# Patient Record
Sex: FEMALE | Race: WHITE | NOT HISPANIC OR LATINO | Employment: FULL TIME | URBAN - METROPOLITAN AREA
[De-identification: names, ages, dates, MRNs, and addresses within clinical notes are randomized per-mention and may not be internally consistent; named-entity substitution may affect disease eponyms.]

---

## 2017-12-06 ENCOUNTER — GENERIC CONVERSION - ENCOUNTER (OUTPATIENT)
Dept: OTHER | Facility: OTHER | Age: 52
End: 2017-12-06

## 2017-12-11 ENCOUNTER — LAB CONVERSION - ENCOUNTER (OUTPATIENT)
Dept: OTHER | Facility: OTHER | Age: 52
End: 2017-12-11

## 2017-12-11 LAB
ADDITIONAL INFORMATION (HISTORICAL): NORMAL
ADEQUACY: (HISTORICAL): NORMAL
COMMENT (HISTORICAL): NORMAL
CYTOTECHNOLOGIST: (HISTORICAL): NORMAL
INTERPRETATION (HISTORICAL): NORMAL
LMP (HISTORICAL): NORMAL
PREV. BX: (HISTORICAL): NORMAL
PREV. PAP (HISTORICAL): NORMAL
SOURCE (HISTORICAL): NORMAL

## 2017-12-21 DIAGNOSIS — Z12.31 ENCOUNTER FOR SCREENING MAMMOGRAM FOR MALIGNANT NEOPLASM OF BREAST: ICD-10-CM

## 2017-12-22 ENCOUNTER — GENERIC CONVERSION - ENCOUNTER (OUTPATIENT)
Dept: GYNECOLOGY | Facility: CLINIC | Age: 52
End: 2017-12-22

## 2018-01-24 VITALS
DIASTOLIC BLOOD PRESSURE: 78 MMHG | WEIGHT: 157.38 LBS | HEIGHT: 68 IN | BODY MASS INDEX: 23.85 KG/M2 | SYSTOLIC BLOOD PRESSURE: 112 MMHG

## 2018-09-18 ENCOUNTER — OFFICE VISIT (OUTPATIENT)
Dept: URGENT CARE | Facility: CLINIC | Age: 53
End: 2018-09-18
Payer: COMMERCIAL

## 2018-09-18 VITALS
SYSTOLIC BLOOD PRESSURE: 132 MMHG | DIASTOLIC BLOOD PRESSURE: 62 MMHG | BODY MASS INDEX: 22.13 KG/M2 | RESPIRATION RATE: 18 BRPM | HEIGHT: 68 IN | OXYGEN SATURATION: 98 % | HEART RATE: 56 BPM | TEMPERATURE: 98 F | WEIGHT: 146 LBS

## 2018-09-18 DIAGNOSIS — J01.90 ACUTE SINUSITIS, RECURRENCE NOT SPECIFIED, UNSPECIFIED LOCATION: Primary | ICD-10-CM

## 2018-09-18 PROCEDURE — G0382 LEV 3 HOSP TYPE B ED VISIT: HCPCS | Performed by: PHYSICIAN ASSISTANT

## 2018-09-18 RX ORDER — AZITHROMYCIN 250 MG/1
TABLET, FILM COATED ORAL
Qty: 6 TABLET | Refills: 0 | Status: SHIPPED | OUTPATIENT
Start: 2018-09-18 | End: 2018-09-22

## 2018-09-18 RX ORDER — ESTRADIOL 0.04 MG/D
PATCH TRANSDERMAL
COMMUNITY
Start: 2016-08-16 | End: 2018-12-20 | Stop reason: SDUPTHER

## 2018-09-18 RX ORDER — FLUTICASONE PROPIONATE 50 MCG
SPRAY, SUSPENSION (ML) NASAL
COMMUNITY
Start: 2018-08-01

## 2018-09-18 RX ORDER — MONTELUKAST SODIUM 10 MG/1
TABLET ORAL
COMMUNITY
Start: 2014-05-20

## 2018-09-18 NOTE — PATIENT INSTRUCTIONS
Take azithromycin as directed  Increase fluids and rest  Take antihistamine such as allegra or zyrtec  Continue flonase  If no improvement in sx in 2-3 days, f/u with PCP

## 2018-09-18 NOTE — PROGRESS NOTES
NAME: Richard Chamberlain is a 45 y o  female  : 1965    MRN: [id number]      Assessment and Plan   Acute sinusitis, recurrence not specified, unspecified location [J01 90]  1  Acute sinusitis, recurrence not specified, unspecified location  azithromycin (ZITHROMAX) 250 mg tablet           Patient Instructions   Patient Instructions   Take azithromycin as directed  Increase fluids and rest  Take antihistamine such as allegra or zyrtec  Continue flonase  If no improvement in sx in 2-3 days, f/u with PCP      Proceed to ER if symptoms worsen  History of Present Illness     Patient presents complaining of 1 day hx of sinus pain and pressure  She reports long hx of allergies for which she takes montelukast and flonse for  She reports sinus pain and pressure, ear fullness, coryza and rhinorrhea  She denies fevers, chills, SOB, dyspnea, CP  She has not taken anything OTC for her sx  Patient reports hx of adrenal issues for which she is seeing endocrine for and states she is unable to take a steroid  Patient reports hx of sinus infections  Review of Systems   Review of Systems   Constitutional: Negative for chills and fever  HENT: Positive for congestion, ear pain, postnasal drip, rhinorrhea, sinus pain and sinus pressure  Negative for sore throat  Respiratory: Positive for cough  Negative for chest tightness, shortness of breath, wheezing and stridor  Cardiovascular: Negative for chest pain and palpitations           Current Medications       Current Outpatient Prescriptions:     estradiol (CLIMARA) 0 0375 MG/24HR, Place on the skin, Disp: , Rfl:     fluticasone (FLONASE) 50 mcg/act nasal spray, USE 1 SPRAY IN EACH NOSTRIL EVERY DAY, Disp: , Rfl:     montelukast (SINGULAIR) 10 mg tablet, Take by mouth, Disp: , Rfl:     azithromycin (ZITHROMAX) 250 mg tablet, Take 2 tablets today then 1 tablet daily x 4 days, Disp: 6 tablet, Rfl: 0    Current Allergies     Allergies as of 2018 - Reviewed 09/18/2018   Allergen Reaction Noted    Other  04/21/2015    Prochlorperazine Other (See Comments) 12/04/2001    Sulfa antibiotics Hives 10/02/2012    Sulfamethoxazole-trimethoprim  04/21/2015    Topiramate Rash 11/06/2013              No past medical history on file  No past surgical history on file  No family history on file  Medications have been verified  The following portions of the patient's history were reviewed and updated as appropriate: allergies, current medications, past family history, past medical history, past social history, past surgical history and problem list     Objective   /62 (Patient Position: Sitting)   Pulse 56   Temp 98 °F (36 7 °C)   Resp 18   Ht 5' 8" (1 727 m)   Wt 66 2 kg (146 lb)   SpO2 98%   BMI 22 20 kg/m²      Physical Exam     Physical Exam   Constitutional: She appears well-developed and well-nourished  No distress  HENT:   TMs clear b/l without erythema or bulging  Mucopurulent fluid noted behind both TMs  Canals are patent without erythema or edema  Nasal mucosa with erythema and edema  Oropharynx without erythema, edema, tonsillar hypertrophy or exudates  +PND   Neck: Neck supple  Cardiovascular: Normal rate, regular rhythm and normal heart sounds  Pulmonary/Chest: Effort normal and breath sounds normal  No respiratory distress  She has no wheezes  She has no rales  Lymphadenopathy:     She has no cervical adenopathy

## 2018-10-17 ENCOUNTER — OFFICE VISIT (OUTPATIENT)
Dept: GYNECOLOGY | Facility: CLINIC | Age: 53
End: 2018-10-17
Payer: COMMERCIAL

## 2018-10-17 VITALS
WEIGHT: 148.2 LBS | SYSTOLIC BLOOD PRESSURE: 104 MMHG | BODY MASS INDEX: 22.46 KG/M2 | DIASTOLIC BLOOD PRESSURE: 68 MMHG | HEIGHT: 68 IN

## 2018-10-17 DIAGNOSIS — B37.9 CANDIDIASIS: Primary | ICD-10-CM

## 2018-10-17 DIAGNOSIS — N90.7 LABIAL CYST: ICD-10-CM

## 2018-10-17 PROCEDURE — 99213 OFFICE O/P EST LOW 20 MIN: CPT | Performed by: OBSTETRICS & GYNECOLOGY

## 2018-10-17 NOTE — PROGRESS NOTES
Patient presents to the office today complaining of 2 cysts in the labial area 1 on the right side and 1 on the left side  One of them was minimally tender  She she does has some vaginal itching  Denies any fever or urinary complaints  Physical examination:  Abdomen is soft nontender  On pelvic exam there are there is a small inclusion cyst in the left labia minora an on the inferior aspect of the right labia minora  There is also evidence of candidiasis infection  Cervix is normal   Uterus is absent with no adnexal masses    Impression labial cysts  Since asymptomatic will follow     Candidiasis:  Terazol 7 q h s  x1 week

## 2018-12-20 DIAGNOSIS — Z78.0 MENOPAUSE: Primary | ICD-10-CM

## 2018-12-20 RX ORDER — ESTRADIOL 0.04 MG/D
1 PATCH TRANSDERMAL WEEKLY
Qty: 8 PATCH | Refills: 0 | Status: SHIPPED | OUTPATIENT
Start: 2018-12-20 | End: 2018-12-27 | Stop reason: SDUPTHER

## 2018-12-27 ENCOUNTER — ANNUAL EXAM (OUTPATIENT)
Dept: GYNECOLOGY | Facility: CLINIC | Age: 53
End: 2018-12-27
Payer: COMMERCIAL

## 2018-12-27 VITALS
BODY MASS INDEX: 21.76 KG/M2 | DIASTOLIC BLOOD PRESSURE: 64 MMHG | HEIGHT: 68 IN | WEIGHT: 143.6 LBS | SYSTOLIC BLOOD PRESSURE: 102 MMHG

## 2018-12-27 DIAGNOSIS — Z79.890 HORMONE REPLACEMENT THERAPY (HRT): ICD-10-CM

## 2018-12-27 DIAGNOSIS — Z01.419 ENCOUNTER FOR GYNECOLOGICAL EXAMINATION WITHOUT ABNORMAL FINDING: Primary | ICD-10-CM

## 2018-12-27 DIAGNOSIS — Z78.0 MENOPAUSE: ICD-10-CM

## 2018-12-27 DIAGNOSIS — Z01.419 ENCOUNTER FOR GYNECOLOGICAL EXAMINATION WITH PAPANICOLAOU SMEAR OF CERVIX: ICD-10-CM

## 2018-12-27 DIAGNOSIS — Z13.820 SCREENING FOR OSTEOPOROSIS: ICD-10-CM

## 2018-12-27 PROCEDURE — G0143 SCR C/V CYTO,THINLAYER,RESCR: HCPCS | Performed by: OBSTETRICS & GYNECOLOGY

## 2018-12-27 PROCEDURE — 76977 US BONE DENSITY MEASURE: CPT | Performed by: OBSTETRICS & GYNECOLOGY

## 2018-12-27 PROCEDURE — S0612 ANNUAL GYNECOLOGICAL EXAMINA: HCPCS | Performed by: OBSTETRICS & GYNECOLOGY

## 2018-12-27 RX ORDER — ESTRADIOL 0.04 MG/D
1 PATCH TRANSDERMAL 2 TIMES WEEKLY
Qty: 24 PATCH | Refills: 3 | Status: SHIPPED | OUTPATIENT
Start: 2018-12-27 | End: 2019-03-19

## 2018-12-27 NOTE — PROGRESS NOTES
Assessment/Plan:         Diagnoses and all orders for this visit:    Encounter for gynecological examination without abnormal finding    Hormone replacement therapy (HRT): will taper off ERT    Screening for osteoporosis: heel scan -0 3        Subjective:      Patient ID: Oriana Bell is a 52 y o  female  HPI   Annual exam  No c/o  On ERT  Prior [facility]  Colonoscopy age 46 ( 8 yrs)     The following portions of the patient's history were reviewed and updated as appropriate: allergies, current medications, past family history, past medical history, past social history, past surgical history and problem list     Review of Systems   Constitutional: Negative  Gastrointestinal: Negative  Genitourinary: Negative  Objective:      /64   Ht 5' 8" (1 727 m)   Wt 65 1 kg (143 lb 9 6 oz)   BMI 21 83 kg/m²          Physical Exam   Constitutional: She appears well-developed and well-nourished  Neck: Normal range of motion  Neck supple  No thyromegaly present  Cardiovascular: Normal rate, regular rhythm and normal heart sounds  Pulmonary/Chest: Effort normal and breath sounds normal  No respiratory distress  Right breast exhibits no inverted nipple, no mass, no nipple discharge, no skin change and no tenderness  Left breast exhibits no inverted nipple, no mass, no nipple discharge, no skin change and no tenderness  Breasts are symmetrical    Abdominal: Soft  Bowel sounds are normal  She exhibits no distension and no mass  There is no tenderness  There is no rebound and no guarding  Hernia confirmed negative in the right inguinal area and confirmed negative in the left inguinal area  Genitourinary: There is no rash or lesion on the right labia  There is no rash or lesion on the left labia  Cervix exhibits no motion tenderness, no discharge and no friability  Right adnexum displays no mass, no tenderness and no fullness  Left adnexum displays no mass, no tenderness and no fullness   No bleeding in the vagina  No vaginal discharge found  Lymphadenopathy:        Right: No inguinal adenopathy present  Left: No inguinal adenopathy present

## 2019-01-03 LAB
LAB AP GYN PRIMARY INTERPRETATION: NORMAL
Lab: NORMAL

## 2019-01-07 ENCOUNTER — TELEPHONE (OUTPATIENT)
Dept: GYNECOLOGY | Facility: CLINIC | Age: 54
End: 2019-01-07

## 2019-01-07 NOTE — TELEPHONE ENCOUNTER
Called and left message on cell phone for pt   To continue the patch twice weekly call back if no improvement

## 2019-01-07 NOTE — TELEPHONE ENCOUNTER
Pt  Is having worsening anxiety pt  believes it is because she is only using her hormone patch once weekly

## 2019-01-16 ENCOUNTER — DOCUMENTATION (OUTPATIENT)
Dept: GYNECOLOGY | Facility: CLINIC | Age: 54
End: 2019-01-16

## 2019-01-16 DIAGNOSIS — Z78.0 MENOPAUSE: Primary | ICD-10-CM

## 2019-01-16 RX ORDER — AMITRIPTYLINE HYDROCHLORIDE 25 MG/1
25 TABLET, FILM COATED ORAL
Qty: 30 TABLET | Refills: 0 | Status: SHIPPED | OUTPATIENT
Start: 2019-01-16 | End: 2019-01-28 | Stop reason: SDUPTHER

## 2019-01-16 NOTE — PROGRESS NOTES
Pt called she went back on the patch after being off it for 3 weeks restarted it and has been on it weekly for 7 weeks has been having a lot of anxiety feels overwhelmed all the time was told to increase the patch to 2x a week which she is doing but need some help till those kick in Spoke to Dr Dari Pettit he will give her oral pill to help her RX called in

## 2019-01-28 DIAGNOSIS — Z78.0 MENOPAUSE: ICD-10-CM

## 2019-01-29 RX ORDER — AMITRIPTYLINE HYDROCHLORIDE 25 MG/1
25 TABLET, FILM COATED ORAL
Qty: 90 TABLET | Refills: 0 | Status: SHIPPED | OUTPATIENT
Start: 2019-01-29 | End: 2019-02-26

## 2019-02-26 ENCOUNTER — OFFICE VISIT (OUTPATIENT)
Dept: OBGYN CLINIC | Facility: CLINIC | Age: 54
End: 2019-02-26
Payer: COMMERCIAL

## 2019-02-26 VITALS
DIASTOLIC BLOOD PRESSURE: 66 MMHG | WEIGHT: 146 LBS | HEIGHT: 68 IN | SYSTOLIC BLOOD PRESSURE: 112 MMHG | BODY MASS INDEX: 22.13 KG/M2

## 2019-02-26 DIAGNOSIS — Z78.0 MENOPAUSE: ICD-10-CM

## 2019-02-26 PROCEDURE — 99202 OFFICE O/P NEW SF 15 MIN: CPT | Performed by: OBSTETRICS & GYNECOLOGY

## 2019-02-26 RX ORDER — CHOLECALCIFEROL (VITAMIN D3) 25 MCG
CAPSULE ORAL
COMMUNITY
Start: 2013-10-03

## 2019-02-26 NOTE — PROGRESS NOTES
ERT discussion    Pt underwent a hysterectomy and started on ERT @ 46  Pt is on generic for climara 0 0375 2 x week  presciently ran out and was finding out that she was unable to cope   She was what she described as crazy   Angry fighting all the time  Lashing out at  and people for no reason   Was unable to sleep    Pt was placed on antidepressing at this time and was way worse she stopped all that when re started medication   She feels more and more like her self on a regular basis     Pt also states when she was off she was having a increase in libido    She spoke to her adrenal specialist and they recommend her to go on bio identical hormones    Climara is one with estrogen that is plant based and no equine based    Discussed the risk of HRT and she is aware  Getting mammogram regular     After discussion she will stay on what she was on with climara and over the next several months ween her self down and off    Advised that this is a long process and could take months    Will also speak to her doctor for recommendation for supplements for low libido    Follow up yearly       Counseling and coordination of care     I have spent 153 minutes  with patient today more than 50% in counseling and coordinating care        Patient was counseled regarding  Menopause  Hot flashes  medication and side effects         Pap normal yearly since 2013  Mammogram 12/2017 [facility] reports that she went in 1/2019

## 2019-02-27 NOTE — PATIENT INSTRUCTIONS
Menopause   WHAT YOU NEED TO KNOW:   Menopause is a normal stage in a woman's life when her monthly periods stop  Menopause starts when the ovaries slowly stop making the female hormones estrogen and progesterone  A woman who has not had a period for a full year after the age of 39 is considered to be in menopause  Perimenopause is a stage before menopause that may cause signs and symptoms similar to menopause  Perimenopause can last an average of 4 to 5 years  DISCHARGE INSTRUCTIONS:   Follow up with your healthcare provider as directed:  Write down your questions so you remember to ask them during your visits  Self-care:   · Manage hot flashes  Hot flashes are brief periods of feeling very warm, flushed, and sweaty  Hot flashes can last from a few seconds to several minutes  They may happen many times during the day, and are common at night  Layer your clothing so that you can easily remove some clothing and cool yourself during a hot flash  Cold drinks may also be helpful  · Reduce vaginal dryness  by using over-the-counter vaginal creams  Vaginal dryness may cause you to have pain or discomfort during sex  Only use creams that are made for vaginal use  Do  not  use petroleum jelly  You may need an estrogen cream to put in and around your vagina  Estrogen cream may help decrease vaginal dryness and lower your risk of vaginal infections  · Continue to use birth control  during perimenopause if you do not want to get pregnant  You may need to use birth control until it has been 1 year since your periods stopped  Ask your healthcare provider when you can stop using birth control to prevent pregnancy  Lead a healthy lifestyle:  After menopause, your risk for heart disease and bone loss increases  Ask about these and other ways to stay healthy:  · Exercise regularly  Exercise helps you maintain a healthy weight  Exercise can also help to control your blood pressure and cholesterol levels   Include weight-bearing exercise for strong bones  Ask your healthcare provider about the best exercise plan for you  · Eat a variety of healthy foods  Include fruits, vegetables, whole grains (whole-wheat bread, pasta, and cereals), low-fat dairy, and lean protein foods (beans, poultry, and fish)  Limit foods high in sodium (salt)  Ask your healthcare provider for more information about a meal plan that is right for you  · Maintain a healthy weight  Check with your healthcare provider before you start any weight loss program      · Take supplements as directed  You may need extra calcium and vitamin D to help prevent osteoporosis  · Limit alcohol and caffeine  Alcohol and caffeine may worsen your symptoms  · Do not smoke  If you smoke, it is never too late to quit  You are more likely to have a heart attack, lung disease, blood clots, and cancer if you smoke  Ask your healthcare provider for information if you need help quitting  Contact your healthcare provider if:   · You have vaginal bleeding after menopause  · You have questions or concerns about your condition or care  © 2017 [facility] Information is for End User's use only and may not be sold, redistributed or otherwise used for commercial purposes  All illustrations and images included in CareNotes® are the copyrighted property of A D A M , Inc  or Humberto Thomas  The above information is an  only  It is not intended as medical advice for individual conditions or treatments  Talk to your doctor, nurse or pharmacist before following any medical regimen to see if it is safe and effective for you

## 2024-04-11 ENCOUNTER — OFFICE VISIT (OUTPATIENT)
Dept: FAMILY MEDICINE CLINIC | Facility: CLINIC | Age: 59
End: 2024-04-11
Payer: COMMERCIAL

## 2024-04-11 VITALS
HEIGHT: 68 IN | DIASTOLIC BLOOD PRESSURE: 54 MMHG | BODY MASS INDEX: 23.13 KG/M2 | HEART RATE: 59 BPM | WEIGHT: 152.6 LBS | SYSTOLIC BLOOD PRESSURE: 95 MMHG | OXYGEN SATURATION: 98 %

## 2024-04-11 DIAGNOSIS — B96.89 ACUTE BACTERIAL SINUSITIS: ICD-10-CM

## 2024-04-11 DIAGNOSIS — R05.9 COUGH, UNSPECIFIED TYPE: Primary | ICD-10-CM

## 2024-04-11 DIAGNOSIS — J01.90 ACUTE BACTERIAL SINUSITIS: ICD-10-CM

## 2024-04-11 PROBLEM — E27.9 ADRENAL GLAND DYSFUNCTION (HCC): Status: ACTIVE | Noted: 2024-04-11

## 2024-04-11 PROCEDURE — 99213 OFFICE O/P EST LOW 20 MIN: CPT | Performed by: FAMILY MEDICINE

## 2024-04-11 RX ORDER — DOXYCYCLINE HYCLATE 100 MG
100 TABLET ORAL 2 TIMES DAILY
Qty: 14 TABLET | Refills: 0 | Status: SHIPPED | OUTPATIENT
Start: 2024-04-11 | End: 2024-04-18

## 2024-04-11 RX ORDER — ESCITALOPRAM OXALATE 10 MG/1
1 TABLET ORAL DAILY
COMMUNITY
Start: 2024-04-08

## 2024-04-11 NOTE — PROGRESS NOTES
"Assessment/Plan:       1. Cough, unspecified type  Comments:  start doxycycline    2. Acute bacterial sinusitis  Comments:  start doxycycline  Orders:  -     doxycycline hyclate (VIBRA-TABS) 100 mg tablet; Take 1 tablet (100 mg total) by mouth 2 (two) times a day for 7 days        Consider advair if needed  Subjective:      Patient ID: Casie Rosenberg is a 58 y.o. female.    Casie has had a cough for many months.  She was initially dx with pneumonia and given a penicillin antibiotic.  Her symptoms returned she was told she had a viral infection and had a chest x-ray which was clear.  She still has a persistent cough that is nagging.  She does not have a fever she did have a sore throat and some fatigue at some point.  She is not having any weight loss or chills or night sweats.  The cough is worse in the morning.  She does have some sinus pain and pressure with postnasal drip and allergies.    Cough        The following portions of the patient's history were reviewed and updated as appropriate: allergies, current medications, past family history, past medical history, past social history, past surgical history, and problem list.    Review of Systems   Respiratory:  Positive for cough.          Objective:      BP 95/54 (BP Location: Left arm, Patient Position: Sitting, Cuff Size: Large)   Pulse 59   Ht 5' 8\" (1.727 m)   Wt 69.2 kg (152 lb 9.6 oz)   SpO2 98%   BMI 23.20 kg/m²          Physical Exam  Vitals and nursing note reviewed.   Constitutional:       Appearance: Normal appearance.   HENT:      Head: Normocephalic and atraumatic.      Nose: Nose normal.      Mouth/Throat:      Mouth: Mucous membranes are moist.   Eyes:      Extraocular Movements: Extraocular movements intact.      Pupils: Pupils are equal, round, and reactive to light.   Cardiovascular:      Rate and Rhythm: Normal rate and regular rhythm.      Pulses: Normal pulses.   Pulmonary:      Effort: Pulmonary effort is normal.      Breath sounds: " Normal breath sounds.   Abdominal:      General: Bowel sounds are normal.      Palpations: Abdomen is soft.   Musculoskeletal:      Cervical back: Normal range of motion.   Skin:     General: Skin is warm and dry.      Capillary Refill: Capillary refill takes less than 2 seconds.   Neurological:      General: No focal deficit present.      Mental Status: She is alert.   Psychiatric:         Mood and Affect: Mood normal.

## 2024-06-22 ENCOUNTER — PATIENT MESSAGE (OUTPATIENT)
Dept: FAMILY MEDICINE CLINIC | Facility: CLINIC | Age: 59
End: 2024-06-22

## 2024-06-24 NOTE — PATIENT COMMUNICATION
Pt returned call to schedule the office visit helped to get her for this Thursday as per her request. Thanks

## 2024-06-27 ENCOUNTER — OFFICE VISIT (OUTPATIENT)
Dept: FAMILY MEDICINE CLINIC | Facility: CLINIC | Age: 59
End: 2024-06-27
Payer: COMMERCIAL

## 2024-06-27 VITALS
SYSTOLIC BLOOD PRESSURE: 103 MMHG | HEIGHT: 68 IN | WEIGHT: 152.6 LBS | OXYGEN SATURATION: 98 % | HEART RATE: 51 BPM | DIASTOLIC BLOOD PRESSURE: 55 MMHG | BODY MASS INDEX: 23.13 KG/M2

## 2024-06-27 DIAGNOSIS — Z12.11 COLON CANCER SCREENING: ICD-10-CM

## 2024-06-27 DIAGNOSIS — R31.0 GROSS HEMATURIA: Primary | ICD-10-CM

## 2024-06-27 DIAGNOSIS — Z12.11 SCREENING FOR COLON CANCER: ICD-10-CM

## 2024-06-27 DIAGNOSIS — F41.9 ANXIETY: ICD-10-CM

## 2024-06-27 PROCEDURE — 99214 OFFICE O/P EST MOD 30 MIN: CPT | Performed by: FAMILY MEDICINE
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
## 2024-06-28 ENCOUNTER — TELEPHONE (OUTPATIENT)
Age: 59
End: 2024-06-28

## 2024-06-28 ENCOUNTER — TELEPHONE (OUTPATIENT)
Dept: FAMILY MEDICINE CLINIC | Facility: CLINIC | Age: 59
End: 2024-06-28

## 2024-06-28 DIAGNOSIS — R31.0 GROSS HEMATURIA: Primary | ICD-10-CM

## 2024-06-28 NOTE — TELEPHONE ENCOUNTER
Radiology department called needs these med's called into pharmacy for CT next week    Medrol 23 mg po 12 hours before her exam   Medrol 32 po 2 hours before   Benadryl 50 mg po 1 hr before exam

## 2024-06-28 NOTE — TELEPHONE ENCOUNTER
Sandy from [facility] radiology called in stating that the patient is scheduled for CT scan. And needs some Allergy prep to be ordered by Dr. Budinetz. She has the med list and she wish to provide it to nurse. Triston transferred the call to practice clinical was answered by Jennie. Thanks

## 2024-06-28 NOTE — NURSING NOTE
Called patient to verify contrast allergy for upcoming CT renal protocol scan on 7/3/24. Patient states she had some snee[facility] for [facility] She denied receiving treatment or any other interventions. Verified with radiologist, Dr. Pritchett, that standard allergy prep will need to be ordered. Contacted ordering provider (Dr. Budinetz) office and spoke with Jennie. Provided medrol and benadryl dosing, routes and time of administration with a need for provider to call a script to patient's pharmacy. Returned call to patient detailing allergy prep medications and exact times of administration. Using teach-back method, patient verbalized understanding.

## 2024-07-01 NOTE — TELEPHONE ENCOUNTER
Patient called to find out the status of medication for Medrol 23 mg po 12 hours before her exam Medrol 32 po 2 hours before Benadryl 50 mg po 1 hr before exam for allergy to CT contrast. Informed the patient that The CT scan was ordered With out contrast. Patient verbalized understanding

## 2024-07-02 ENCOUNTER — TELEPHONE (OUTPATIENT)
Dept: INTERVENTIONAL RADIOLOGY/VASCULAR | Facility: HOSPITAL | Age: 59
End: 2024-07-02

## 2024-07-03 ENCOUNTER — HOSPITAL ENCOUNTER (OUTPATIENT)
Dept: CT IMAGING | Facility: HOSPITAL | Age: 59
Discharge: HOME | End: 2024-07-03
Payer: COMMERCIAL

## 2024-07-03 DIAGNOSIS — R31.0 GROSS HEMATURIA: ICD-10-CM

## 2024-07-03 PROCEDURE — 74176 CT ABD & PELVIS W/O CONTRAST: CPT

## 2024-07-05 ENCOUNTER — TELEPHONE (OUTPATIENT)
Dept: FAMILY MEDICINE CLINIC | Facility: CLINIC | Age: 59
End: 2024-07-05

## 2024-07-05 DIAGNOSIS — N20.0 NEPHROLITHIASIS: Primary | ICD-10-CM

## 2024-07-05 NOTE — TELEPHONE ENCOUNTER
----- Message from Robert Budinetz, MD sent at 7/4/2024  7:02 AM EDT -----  She does have bilateral kidney stones probably accounting for her symptoms.  None are acutely causing the problem.  I would recommend that she follow-up with a kidney stone specialist.  Dr. Parekh at [facility] would be a good option

## 2024-07-25 LAB — COLOGUARD RESULT REPORTABLE: NEGATIVE

## 2024-08-29 ENCOUNTER — TELEMEDICINE (OUTPATIENT)
Dept: FAMILY MEDICINE CLINIC | Facility: CLINIC | Age: 59
End: 2024-08-29
Payer: COMMERCIAL

## 2024-08-29 VITALS — WEIGHT: 152.6 LBS | HEIGHT: 68 IN | BODY MASS INDEX: 23.13 KG/M2

## 2024-08-29 DIAGNOSIS — J06.9 UPPER RESPIRATORY TRACT INFECTION, UNSPECIFIED TYPE: Primary | ICD-10-CM

## 2024-08-29 PROCEDURE — 99213 OFFICE O/P EST LOW 20 MIN: CPT | Performed by: FAMILY MEDICINE

## 2024-08-29 RX ORDER — ESTRADIOL 0.1 MG/G
CREAM VAGINAL
COMMUNITY
Start: 2024-07-01

## 2024-08-29 RX ORDER — AZITHROMYCIN 250 MG/1
TABLET, FILM COATED ORAL
Qty: 6 TABLET | Refills: 0 | Status: SHIPPED | OUTPATIENT
Start: 2024-08-29 | End: 2024-09-02

## 2024-08-29 NOTE — PROGRESS NOTES
"Virtual Regular Visit  Name: Casie Rosenberg      : 1965      MRN: [id number]  Encounter Provider: Robert Budinetz, MD  Encounter Date: 2024   Encounter department: [facility]    Verification of patient location:    Patient is located at Home in the following state in which I hold an active license [place]    Assessment & Plan   1. Upper respiratory tract infection, unspecified type  -     azithromycin (ZITHROMAX) 250 mg tablet; Take 2 tablets today then 1 tablet daily x 4 days  Symptomatic care  Zpak if no better over the weekend       Encounter provider Robert Budinetz, MD    The patient was identified by name and date of birth. Casie Rosenberg was informed that this is a telemedicine visit and that the visit is being conducted through the [employer] platform. She agrees to proceed..  My office door was closed. No one else was in the room.  She acknowledged consent and understanding of privacy and security of the video platform. The patient has agreed to participate and understands they can discontinue the visit at any time.    Patient is aware this is a billable service.     History of Present Illness     The patient is not feeling well for a few days.  She does not have a fever but does have aches/chills/sweats/flushing/nausea/sore throat/fatigue.  She did a covid test which was negative.  She has a cough.  She has mild nasal congestion.  This started 4 days ago.  She started with nausea.  Her energy is really drained.        Review of Systems   Respiratory: Negative.     Cardiovascular: Negative.    Gastrointestinal: Negative.      Pertinent Medical History         Objective     Ht 5' 8\" (1.727 m)   Wt 69.2 kg (152 lb 9.6 oz)   BMI 23.20 kg/m²   Physical Exam  Constitutional:       Appearance: She is well-developed.         Visit Time  Total Visit Duration: 12 min        "

## 2024-12-09 ENCOUNTER — RA CDI HCC (OUTPATIENT)
Dept: OTHER | Facility: HOSPITAL | Age: 59
End: 2024-12-09

## 2024-12-11 ENCOUNTER — TELEPHONE (OUTPATIENT)
Dept: ADMINISTRATIVE | Facility: OTHER | Age: 59
End: 2024-12-11

## 2024-12-11 NOTE — TELEPHONE ENCOUNTER
Upon review of the In Basket request we were able to locate, review, and update the patient chart as requested for Pap Smear (HPV) aka Cervical Cancer Screening.    Any additional questions or concerns should be emailed to the Practice Liaisons via the appropriate education email address, please do not reply via In Basket.    Thank you  BETTY GUERRERO MA   [facility]

## 2024-12-11 NOTE — TELEPHONE ENCOUNTER
----- Message from Hodan DEL VALLE sent at 12/11/2024  8:49 AM EST -----  Regarding: Care Gap request  12/11/24 8:49 AM    Hello, our patient attached above has had Pap Smear (HPV) aka Cervical Cancer Screening completed/performed. Please assist in updating the patient chart by pulling the Care Everywhere (CE) document. The date of service is 6/2024.     Thank you,  Hodan Gifford  [facility]

## 2024-12-13 ENCOUNTER — OFFICE VISIT (OUTPATIENT)
Dept: FAMILY MEDICINE CLINIC | Facility: CLINIC | Age: 59
End: 2024-12-13
Payer: COMMERCIAL

## 2024-12-13 VITALS
DIASTOLIC BLOOD PRESSURE: 64 MMHG | SYSTOLIC BLOOD PRESSURE: 104 MMHG | OXYGEN SATURATION: 99 % | BODY MASS INDEX: 23.73 KG/M2 | HEIGHT: 68 IN | WEIGHT: 156.6 LBS | HEART RATE: 61 BPM

## 2024-12-13 DIAGNOSIS — H69.93 EUSTACHIAN TUBE DYSFUNCTION, BILATERAL: Primary | ICD-10-CM

## 2024-12-13 DIAGNOSIS — E27.9 ADRENAL GLAND DYSFUNCTION (HCC): ICD-10-CM

## 2024-12-13 PROCEDURE — 99213 OFFICE O/P EST LOW 20 MIN: CPT | Performed by: FAMILY MEDICINE

## 2024-12-13 RX ORDER — FLUCONAZOLE 150 MG/1
TABLET ORAL
COMMUNITY
Start: 2024-09-05 | End: 2024-12-13

## 2024-12-13 RX ORDER — PREDNISONE 10 MG/1
TABLET ORAL
Qty: 24 TABLET | Refills: 0 | Status: SHIPPED | OUTPATIENT
Start: 2024-12-13 | End: 2024-12-25

## 2024-12-13 NOTE — PROGRESS NOTES
"Name: Casie Rosenberg      : 1965      MRN: [id number]  Encounter Provider: Robert Budinetz, MD  Encounter Date: 2024   Encounter department: [facility]  :  Assessment & Plan  Eustachian tube dysfunction, bilateral    Orders:  •  predniSONE 10 mg tablet; Take 3 tablets (30 mg total) by mouth daily for 4 days, THEN 2 tablets (20 mg total) daily for 4 days, THEN 1 tablet (10 mg total) daily for 4 days.  start prednisone  Adrenal gland dysfunction (HCC)                History of Present Illness     The patient was recently seen in urgent care.  She had some dizziness and vertigo and bilateral ear fullness.  She was given a decongestant and nasal steroid.  Her symptoms persist.  She did have some nausea.  On exam she does have posterior tympanic membrane fluid.  Her symptoms are consistent with eustachian tube dysfunction.  We will start a taper of a low-dose steroid.  She can continue the nasal steroid.    Dizziness    Review of Systems   Respiratory: Negative.     Cardiovascular: Negative.    Gastrointestinal: Negative.    Neurological:  Positive for dizziness.       Objective   /64 (BP Location: Left arm, Patient Position: Sitting, Cuff Size: Standard)   Pulse 61   Ht 5' 8\" (1.727 m)   Wt 71 kg (156 lb 9.6 oz)   SpO2 99%   BMI 23.81 kg/m²      Physical Exam  Vitals and nursing note reviewed.   Constitutional:       General: She is not in acute distress.     Appearance: She is well-developed.   HENT:      Head: Normocephalic and atraumatic.      Ears:      Comments: TM's full b/l with posterior fluid  Eyes:      Conjunctiva/sclera: Conjunctivae normal.   Cardiovascular:      Rate and Rhythm: Normal rate and regular rhythm.      Heart sounds: No murmur heard.  Pulmonary:      Effort: Pulmonary effort is normal. No respiratory distress.      Breath sounds: Normal breath sounds.   Abdominal:      Palpations: Abdomen is soft.      Tenderness: There is no abdominal tenderness. "   Musculoskeletal:         General: No swelling.      Cervical back: Neck supple.   Skin:     General: Skin is warm and dry.      Capillary Refill: Capillary refill takes less than 2 seconds.   Neurological:      Mental Status: She is alert.   Psychiatric:         Mood and Affect: Mood normal.

## 2024-12-23 DIAGNOSIS — H66.90 OTITIS, UNSPECIFIED LATERALITY: Primary | ICD-10-CM

## 2024-12-23 RX ORDER — AZITHROMYCIN 250 MG/1
TABLET, FILM COATED ORAL
Qty: 6 TABLET | Refills: 0 | Status: SHIPPED | OUTPATIENT
Start: 2024-12-23 | End: 2024-12-27

## 2025-03-10 ENCOUNTER — LAB (OUTPATIENT)
Dept: LAB | Facility: CLINIC | Age: 60
End: 2025-03-10
Payer: COMMERCIAL

## 2025-03-10 ENCOUNTER — OFFICE VISIT (OUTPATIENT)
Dept: FAMILY MEDICINE CLINIC | Facility: CLINIC | Age: 60
End: 2025-03-10
Payer: COMMERCIAL

## 2025-03-10 VITALS
BODY MASS INDEX: 24.46 KG/M2 | SYSTOLIC BLOOD PRESSURE: 124 MMHG | HEIGHT: 68 IN | DIASTOLIC BLOOD PRESSURE: 80 MMHG | RESPIRATION RATE: 16 BRPM | WEIGHT: 161.4 LBS | HEART RATE: 64 BPM | OXYGEN SATURATION: 98 %

## 2025-03-10 DIAGNOSIS — E06.3 HYPOTHYROIDISM DUE TO HASHIMOTO THYROIDITIS: Primary | ICD-10-CM

## 2025-03-10 DIAGNOSIS — E27.9 ADRENAL GLAND DYSFUNCTION (HCC): ICD-10-CM

## 2025-03-10 DIAGNOSIS — Z13.220 LIPID SCREENING: ICD-10-CM

## 2025-03-10 DIAGNOSIS — R53.83 OTHER FATIGUE: ICD-10-CM

## 2025-03-10 DIAGNOSIS — R63.5 WEIGHT GAIN: ICD-10-CM

## 2025-03-10 DIAGNOSIS — R10.11 RUQ PAIN: Primary | ICD-10-CM

## 2025-03-10 LAB
ALBUMIN SERPL BCG-MCNC: 4.5 G/DL (ref 3.5–5)
ALP SERPL-CCNC: 86 U/L (ref 34–104)
ALT SERPL W P-5'-P-CCNC: 11 U/L (ref 7–52)
ANION GAP SERPL CALCULATED.3IONS-SCNC: 6 MMOL/L (ref 4–13)
AST SERPL W P-5'-P-CCNC: 11 U/L (ref 13–39)
BASOPHILS # BLD AUTO: 0.04 THOUSANDS/ÂΜL (ref 0–0.1)
BASOPHILS NFR BLD AUTO: 1 % (ref 0–1)
BILIRUB SERPL-MCNC: 0.59 MG/DL (ref 0.2–1)
BUN SERPL-MCNC: 31 MG/DL (ref 5–25)
CALCIUM SERPL-MCNC: 9.7 MG/DL (ref 8.4–10.2)
CHLORIDE SERPL-SCNC: 102 MMOL/L (ref 96–108)
CO2 SERPL-SCNC: 31 MMOL/L (ref 21–32)
CREAT SERPL-MCNC: 0.86 MG/DL (ref 0.6–1.3)
EOSINOPHIL # BLD AUTO: 0.12 THOUSAND/ÂΜL (ref 0–0.61)
EOSINOPHIL NFR BLD AUTO: 2 % (ref 0–6)
ERYTHROCYTE [DISTWIDTH] IN BLOOD BY AUTOMATED COUNT: 11.9 % (ref 11.6–15.1)
GFR SERPL CREATININE-BSD FRML MDRD: 73 ML/MIN/1.73SQ M
GLUCOSE SERPL-MCNC: 103 MG/DL (ref 65–140)
HCT VFR BLD AUTO: 42.5 % (ref 34.8–46.1)
HGB BLD-MCNC: 14.7 G/DL (ref 11.5–15.4)
IMM GRANULOCYTES # BLD AUTO: 0.01 THOUSAND/UL (ref 0–0.2)
IMM GRANULOCYTES NFR BLD AUTO: 0 % (ref 0–2)
LIPASE SERPL-CCNC: 38 U/L (ref 11–82)
LYMPHOCYTES # BLD AUTO: 1.33 THOUSANDS/ÂΜL (ref 0.6–4.47)
LYMPHOCYTES NFR BLD AUTO: 25 % (ref 14–44)
MCH RBC QN AUTO: 33.4 PG (ref 26.8–34.3)
MCHC RBC AUTO-ENTMCNC: 34.6 G/DL (ref 31.4–37.4)
MCV RBC AUTO: 97 FL (ref 82–98)
MONOCYTES # BLD AUTO: 0.48 THOUSAND/ÂΜL (ref 0.17–1.22)
MONOCYTES NFR BLD AUTO: 9 % (ref 4–12)
NEUTROPHILS # BLD AUTO: 3.37 THOUSANDS/ÂΜL (ref 1.85–7.62)
NEUTS SEG NFR BLD AUTO: 63 % (ref 43–75)
NRBC BLD AUTO-RTO: 0 /100 WBCS
PLATELET # BLD AUTO: 249 THOUSANDS/UL (ref 149–390)
PMV BLD AUTO: 10.9 FL (ref 8.9–12.7)
POTASSIUM SERPL-SCNC: 4.2 MMOL/L (ref 3.5–5.3)
PROT SERPL-MCNC: 7.3 G/DL (ref 6.4–8.4)
RBC # BLD AUTO: 4.4 MILLION/UL (ref 3.81–5.12)
SODIUM SERPL-SCNC: 139 MMOL/L (ref 135–147)
T4 FREE SERPL-MCNC: 0.8 NG/DL (ref 0.61–1.12)
TSH SERPL DL<=0.05 MIU/L-ACNC: 5.12 UIU/ML (ref 0.45–4.5)
WBC # BLD AUTO: 5.35 THOUSAND/UL (ref 4.31–10.16)

## 2025-03-10 PROCEDURE — 36415 COLL VENOUS BLD VENIPUNCTURE: CPT

## 2025-03-10 PROCEDURE — 83690 ASSAY OF LIPASE: CPT

## 2025-03-10 PROCEDURE — 84439 ASSAY OF FREE THYROXINE: CPT

## 2025-03-10 PROCEDURE — 99214 OFFICE O/P EST MOD 30 MIN: CPT | Performed by: FAMILY MEDICINE

## 2025-03-10 PROCEDURE — 85025 COMPLETE CBC W/AUTO DIFF WBC: CPT

## 2025-03-10 PROCEDURE — 80053 COMPREHEN METABOLIC PANEL: CPT

## 2025-03-10 PROCEDURE — 84443 ASSAY THYROID STIM HORMONE: CPT

## 2025-03-10 RX ORDER — ESTRADIOL 10 UG/1
1 TABLET VAGINAL 2 TIMES WEEKLY
COMMUNITY
Start: 2024-12-01 | End: 2025-03-24

## 2025-03-10 RX ORDER — ESTRADIOL 0.05 MG/D
1 PATCH TRANSDERMAL WEEKLY
COMMUNITY
Start: 2025-01-10

## 2025-03-10 NOTE — PROGRESS NOTES
Name: Casie Rosenberg      : 1965      MRN: [id number]  Encounter Provider: Robert Budinetz, MD  Encounter Date: 3/10/2025   Encounter department: [facility] PRIMARY CARE  :  Assessment & Plan  RUQ pain  Check labs and ultrasound  Orders:    US abdomen complete; Future    Lipase; Future    Other fatigue  Check labs  Orders:    CBC and differential; Future    Comprehensive metabolic panel; Future    TSH, 3rd generation with Free T4 reflex; Future    Weight gain  Check labs  Orders:    CBC and differential; Future    Comprehensive metabolic panel; Future    TSH, 3rd generation with Free T4 reflex; Future    Lipid screening  Check labs       Adrenal gland dysfunction (HCC)                History of Present Illness   The patient has had intermittent right upper quadrant pain that radiates to the right groin region for the past week.  She has had this off and on for about 10 years.  She had a negative test HIDA scan in the past.  She had a CT of her abdomen pelvis in July that showed kidney stone she is seeing a specialist had a workup that seems to be fine.  There were no findings in her liver or gallbladder at that time.  She does get migraine headaches and unusual smells from time to time which may be aura.  Perhaps she has abdominal migraine syndrome.  She was on Elavil in the past.  She has not had any fever.    Abdominal Pain  This is a new problem. The current episode started in the past 7 days. The onset quality is sudden. The problem occurs constantly. The most recent episode lasted 4 days. The problem has been unchanged. The pain is located in the periumbilical region and right flank. The pain is at a severity of 4/10. The quality of the pain is dull and a sensation of fullness. The abdominal pain radiates to the RLQ. Associated symptoms include anorexia, arthralgias, headaches and nausea. Pertinent negatives include no belching, constipation, diarrhea, dysuria, fever, flatus, frequency,  "hematochezia, hematuria, melena, myalgias, vomiting or weight loss. Nothing aggravates the pain. The pain is relieved by Nothing. Prior diagnostic workup includes barium enema, CT scan, lower endoscopy, ultrasound and upper endoscopy.     Review of Systems   Constitutional:  Negative for fever and weight loss.   Respiratory: Negative.     Cardiovascular: Negative.    Gastrointestinal:  Positive for abdominal pain, anorexia and nausea. Negative for constipation, diarrhea, flatus, hematochezia, melena and vomiting.   Genitourinary:  Negative for dysuria, frequency and hematuria.   Musculoskeletal:  Positive for arthralgias. Negative for myalgias.   Neurological:  Positive for headaches.       Objective   /80 (BP Location: Left arm, Patient Position: Sitting, Cuff Size: Standard)   Pulse 64   Resp 16   Ht 5' 8\" (1.727 m)   Wt 73.2 kg (161 lb 6.4 oz)   SpO2 98%   BMI 24.54 kg/m²      Physical Exam  Vitals and nursing note reviewed.   Constitutional:       General: She is not in acute distress.     Appearance: She is well-developed.   HENT:      Head: Normocephalic and atraumatic.   Eyes:      Conjunctiva/sclera: Conjunctivae normal.   Cardiovascular:      Rate and Rhythm: Normal rate and regular rhythm.      Heart sounds: No murmur heard.  Pulmonary:      Effort: Pulmonary effort is normal. No respiratory distress.      Breath sounds: Normal breath sounds.   Abdominal:      Palpations: Abdomen is soft.      Tenderness: There is no abdominal tenderness.   Musculoskeletal:         General: No swelling.      Cervical back: Neck supple.   Skin:     General: Skin is warm and dry.      Capillary Refill: Capillary refill takes less than 2 seconds.   Neurological:      Mental Status: She is alert.   Psychiatric:         Mood and Affect: Mood normal.         "

## 2025-03-11 ENCOUNTER — RESULTS FOLLOW-UP (OUTPATIENT)
Dept: FAMILY MEDICINE CLINIC | Facility: CLINIC | Age: 60
End: 2025-03-11

## 2025-03-11 RX ORDER — LEVOTHYROXINE SODIUM 25 UG/1
25 TABLET ORAL DAILY
Qty: 30 TABLET | Refills: 3 | Status: SHIPPED | OUTPATIENT
Start: 2025-03-11

## 2025-03-12 ENCOUNTER — NURSE TRIAGE (OUTPATIENT)
Age: 60
End: 2025-03-12

## 2025-03-12 NOTE — TELEPHONE ENCOUNTER
Lvm for pt in regards to response from provider from message sent earlier today. Asked pt to call our office when able

## 2025-03-12 NOTE — TELEPHONE ENCOUNTER
"FOLLOW UP: Callback with PCP recommendations     REASON FOR CONVERSATION: Medication Problem    SYMPTOMS: Leg pain, hip pain, headache 3 hours after first dose last night     OTHER: N/A    DISPOSITION: Callback by PCP Today      Reason for Disposition   Caller wants to use a complementary or alternative medicine    Answer Assessment - Initial Assessment Questions  1. NAME of MEDICINE: \"What medicine(s) are you calling about?\"      Levothyroxine   2. QUESTION: \"What is your question?\" (e.g., double dose of medicine, side effect)      Side effect   3. PRESCRIBER: \"Who prescribed the medicine?\" Reason: if prescribed by specialist, call should be referred to that group.      PCP  4. SYMPTOMS: \"Do you have any symptoms?\" If Yes, ask: \"What symptoms are you having?\"  \"How bad are the symptoms (e.g., mild, moderate, severe)      Leg pain, hip pain, headache 3 hours after taking medication, severe at the time  5. PREGNANCY:  \"Is there any chance that you are pregnant?\" \"When was your last menstrual period?\"      N/A    Protocols used: Medication Question Call-Adult-[place]    "

## 2025-03-12 NOTE — TELEPHONE ENCOUNTER
Patient called, relayed PCP message re: levothyroxine (Synthroid) 25 mcg tablet       I really do not have an alternative medicine       Pt is looking for additional advice re: medication:  Instead of taking at night time, can she take in the morning?  Maybe this will help lessen her symptoms of insomnia, hip/leg pain, headaches?  PCP's thoughts?    Since this is a new medication, once patient takes for a period of time, does PCP think symptoms could lessen over time?    Pt is kindly requesting a call back  [phone]

## 2025-03-13 ENCOUNTER — HOSPITAL ENCOUNTER (OUTPATIENT)
Dept: ULTRASOUND IMAGING | Facility: HOSPITAL | Age: 60
End: 2025-03-13
Payer: COMMERCIAL

## 2025-03-13 DIAGNOSIS — R10.11 RUQ PAIN: ICD-10-CM

## 2025-03-13 PROCEDURE — 76700 US EXAM ABDOM COMPLETE: CPT

## 2025-03-17 ENCOUNTER — TELEPHONE (OUTPATIENT)
Age: 60
End: 2025-03-17

## 2025-03-17 NOTE — TELEPHONE ENCOUNTER
Patient has had a US ABDOMEN COMPLETE on 3/13 and is wondering why she hasn't received any results back from the study.    Please advise and follow up with patient on her results @[phone].

## 2025-03-19 ENCOUNTER — RESULTS FOLLOW-UP (OUTPATIENT)
Dept: FAMILY MEDICINE CLINIC | Facility: CLINIC | Age: 60
End: 2025-03-19

## 2025-03-21 ENCOUNTER — TELEPHONE (OUTPATIENT)
Age: 60
End: 2025-03-21

## 2025-03-21 DIAGNOSIS — R10.11 RUQ PAIN: Primary | ICD-10-CM

## 2025-03-21 NOTE — TELEPHONE ENCOUNTER
Patient calls to report that she still has RUQ abdominal pain. No fever, no bleeding, no N/V, no diarrhea, poor appetite. Pain is uncomfortable but not severe. Patient feels like there is a mass or a ball in the area. Sitting makes her more uncomfortable. Patient can be reached at [phone]. Patient did follow up with urology. This pain is not from a urological condition.

## 2025-03-24 ENCOUNTER — TELEPHONE (OUTPATIENT)
Age: 60
End: 2025-03-24

## 2025-03-24 ENCOUNTER — TELEMEDICINE (OUTPATIENT)
Dept: FAMILY MEDICINE CLINIC | Facility: CLINIC | Age: 60
End: 2025-03-24
Payer: COMMERCIAL

## 2025-03-24 VITALS — WEIGHT: 161 LBS | HEIGHT: 68 IN | BODY MASS INDEX: 24.4 KG/M2

## 2025-03-24 DIAGNOSIS — H81.13 BENIGN PAROXYSMAL POSITIONAL VERTIGO DUE TO BILATERAL VESTIBULAR DISORDER: Primary | ICD-10-CM

## 2025-03-24 PROCEDURE — 98006 SYNCH AUDIO-VIDEO EST MOD 30: CPT | Performed by: FAMILY MEDICINE

## 2025-03-24 RX ORDER — MECLIZINE HYDROCHLORIDE 25 MG/1
25 TABLET ORAL 3 TIMES DAILY PRN
Qty: 30 TABLET | Refills: 1 | Status: SHIPPED | OUTPATIENT
Start: 2025-03-24

## 2025-03-24 RX ORDER — ALPRAZOLAM 0.25 MG
TABLET ORAL
Qty: 10 TABLET | Refills: 0 | Status: SHIPPED | OUTPATIENT
Start: 2025-03-24

## 2025-03-24 NOTE — TELEPHONE ENCOUNTER
Patient contacted the office this morning in regards to an episode of vertigo she experienced over the weekend starting Saturday night. Room was spinning. Hx in the past, was prescribed Meclizine. Unsure if this is related to the abdominal pain (OV 03/10/25), does have a severe headache and feels awful. Symptoms not as bad as Saturday but still there. If something can be prescribed please send to [facility] in [place]. Please contact patient back with an update, thank you.   Willing to schedule virtual visit with pcp if needed to discuss further.

## 2025-03-24 NOTE — PROGRESS NOTES
"Virtual Regular VisitName: Casie Rosenberg      : 1965      MRN: [id number]  Encounter Provider: Robert Budinetz, MD  Encounter Date: 3/24/2025   Encounter department: [facility] PRIMARY CARE  :  Assessment & Plan  Benign paroxysmal positional vertigo due to bilateral vestibular disorder  Start meclizine           History of Present Illness     The patient has a hx of vertigo.  She is in a current attack.  VT has not helped in the past.  She had a very bad attack recently.  Difficulty walking.  Did not get out of bed until noon the next day.  Her feet are very cold.  This is increasing in frequency.  She has an abd ct scan for persistent ruq pain scheduled for Wednesday.    Abdominal Pain      Review of Systems   Respiratory: Negative.     Cardiovascular: Negative.    Gastrointestinal:  Positive for abdominal pain.       Objective   Ht 5' 8\" (1.727 m)   Wt 73 kg (161 lb)   BMI 24.48 kg/m²     Physical Exam    Administrative Statements   Encounter provider Robert Budinetz, MD    The Patient is located at Home and in the following state in which I hold an active license [place].    The patient was identified by name and date of birth. Casie Rosenberg was informed that this is a telemedicine visit and that the visit is being conducted through the [employer] platform. She agrees to proceed..  My office door was closed. No one else was in the room.  She acknowledged consent and understanding of privacy and security of the video platform. The patient has agreed to participate and understands they can discontinue the visit at any time.    I have spent a total time of  minutes in caring for this patient on the day of the visit/encounter including Risks and benefits of tx options, not including the time spent for establishing the audio/video connection.  "

## 2025-03-26 ENCOUNTER — HOSPITAL ENCOUNTER (OUTPATIENT)
Dept: CT IMAGING | Facility: HOSPITAL | Age: 60
Discharge: HOME | End: 2025-03-26
Payer: COMMERCIAL

## 2025-03-26 DIAGNOSIS — R10.11 RUQ PAIN: ICD-10-CM

## 2025-03-26 PROCEDURE — 74176 CT ABD & PELVIS W/O CONTRAST: CPT

## 2025-03-31 ENCOUNTER — RESULTS FOLLOW-UP (OUTPATIENT)
Dept: FAMILY MEDICINE CLINIC | Facility: CLINIC | Age: 60
End: 2025-03-31

## 2025-04-02 DIAGNOSIS — E06.3 HYPOTHYROIDISM DUE TO HASHIMOTO THYROIDITIS: ICD-10-CM

## 2025-04-03 RX ORDER — LEVOTHYROXINE SODIUM 25 UG/1
25 TABLET ORAL DAILY
Qty: 90 TABLET | Refills: 1 | Status: SHIPPED | OUTPATIENT
Start: 2025-04-03

## 2025-04-22 ENCOUNTER — OFFICE VISIT (OUTPATIENT)
Dept: GASTROENTEROLOGY | Facility: CLINIC | Age: 60
End: 2025-04-22
Payer: COMMERCIAL

## 2025-04-22 VITALS
BODY MASS INDEX: 24.4 KG/M2 | SYSTOLIC BLOOD PRESSURE: 106 MMHG | WEIGHT: 161 LBS | DIASTOLIC BLOOD PRESSURE: 70 MMHG | TEMPERATURE: 97.8 F | HEIGHT: 68 IN

## 2025-04-22 DIAGNOSIS — K59.00 CONSTIPATION, UNSPECIFIED CONSTIPATION TYPE: ICD-10-CM

## 2025-04-22 DIAGNOSIS — R10.13 EPIGASTRIC PAIN: Primary | ICD-10-CM

## 2025-04-22 DIAGNOSIS — R11.0 NAUSEA: ICD-10-CM

## 2025-04-22 PROCEDURE — 99204 OFFICE O/P NEW MOD 45 MIN: CPT

## 2025-04-22 RX ORDER — SODIUM CHLORIDE, SODIUM LACTATE, POTASSIUM CHLORIDE, CALCIUM CHLORIDE 600; 310; 30; 20 MG/100ML; MG/100ML; MG/100ML; MG/100ML
125 INJECTION, SOLUTION INTRAVENOUS CONTINUOUS
Status: CANCELLED | OUTPATIENT
Start: 2025-04-22

## 2025-04-22 NOTE — PROGRESS NOTES
Name: Casie Rosenberg      : 1965      MRN: [id number]  Encounter Provider: Marly Olmos PA-C  Encounter Date: 2025   Encounter department: [facility] [place]  :  Assessment & Plan  Epigastric pain  Patient with epigastric/RUQ pain x 1 month, has since resolved.  She does have ongoing bloating and chronic nausea.  She had ultrasound and CAT scan that was unremarkable except for small kidney stones and constipation.  Will get EGD with biopsy to r/o esophagitis/gastritis/h pylori/PUD. Educated patient on GERD diet/lifestyle changes and provided handouts.   Recommend she limit NSAIDs as able as this could be contributing to symptoms.  Orders:    EGD; Future    Nausea  Plan as above.  Orders:    EGD; Future    Constipation, unspecified constipation type  Patient with chronic constipation, noted to have significant stool burden on prior CT scan.  Recommend high fiber diet, increased water intake, and activity as tolerated to prevent/avoid constipation.   Start MiraLAX daily, can titrate dosing as needed.           History of Present Illness   HPI  Casie Rosenberg is a 59 y.o. female who presents for RUQ pain.  Patient reports she was having constant epigastric/RUQ pain x 1 month.  She states this has since resolved.  She does feel as though she is bloated often, has had chronic nausea.  She did have similar symptoms many years ago and had extensive workup at that time that was unrevealing.  She does have a history of endometriosis and has had many laparoscopic procedures due to this.  She also has chronic constipation, having a bowel movement daily but with incomplete emptying.  She is taking probiotics, greens, magnesium to help with this.  She has tried Ex-Lax in the past but nothing consistently.  She does take ibuprofen a few times a week.  She follows gluten-free diet.  Denies unintentional weight loss, vomiting, heartburn, reflux, dysphagia, odynophagia, melena,  hematochezia.    Recent CBC, CMP, lipase wnl, TSH 5.119  Neg cologuard 7/2024    Prior imaging/procedures:  CT A/P without contrast 3/26/2025: Bilateral nonobstructive renal calculi measuring up to 2 mm, moderate stool throughout colon  Abdominal ultrasound 3/2025: 2 mm nonobstructing right renal calculus possible additional small nonobstructing calculi bilaterally  EGD/colonoscopy about 15 years ago, normal per patient    Review of Systems   Constitutional:  Negative for appetite change, chills and fever.   Respiratory:  Negative for shortness of breath.    Gastrointestinal:  Positive for abdominal distention, constipation and nausea. Negative for abdominal pain, blood in stool, diarrhea and vomiting.     Medical History Reviewed by provider this encounter:     .  Current Outpatient Medications on File Prior to Visit   Medication Sig Dispense Refill    Cholecalciferol (VITAMIN D-3) 1000 units CAPS daily.      Coenzyme Q10 10 MG capsule Take 10 mg by mouth daily      levothyroxine 25 mcg tablet TAKE 1 TABLET BY MOUTH EVERY DAY 90 tablet 1    Multiple Vitamins-Minerals (MULTIVITAMIN PO) daily.      ALPRAZolam (XANAX) 0.25 mg tablet Take 1-2 pills an hour before mri 10 tablet 0    estradiol (CLIMARA) 0.05 mg/24 hr Place 1 patch on the skin once a week      estradiol (ESTRACE) 0.1 mg/g vaginal cream Insert 1 g intravaginally at bedtime twice a week      meclizine (ANTIVERT) 25 mg tablet Take 1 tablet (25 mg total) by mouth 3 (three) times a day as needed for dizziness 30 tablet 1     No current facility-administered medications on file prior to visit.      Social History     Tobacco Use    Smoking status: Never     Passive exposure: Never    Smokeless tobacco: Never   Vaping Use    Vaping status: Never Used   Substance and Sexual Activity    Alcohol use: Yes     Comment: socially    Drug use: No    Sexual activity: Yes     Partners: Male     Birth control/protection: Post-menopausal     Comment: hysterectomy         Objective   There were no vitals taken for this visit.     Physical Exam  Vitals reviewed.   Constitutional:       General: She is not in acute distress.     Appearance: She is not ill-appearing.   HENT:      Head: Normocephalic and atraumatic.   Cardiovascular:      Rate and Rhythm: Normal rate and regular rhythm.   Pulmonary:      Effort: Pulmonary effort is normal.      Breath sounds: Normal breath sounds.   Abdominal:      General: Abdomen is flat. Bowel sounds are normal. There is no distension.      Palpations: Abdomen is soft.      Tenderness: There is no abdominal tenderness.   Skin:     General: Skin is warm and dry.   Neurological:      Mental Status: She is alert. Mental status is at baseline.

## 2025-04-22 NOTE — PATIENT INSTRUCTIONS
Scheduled date of EGD(as of today): 5/1/25  Physician performing EGD: Dr. Combs  Location of EGD: [place]  Instructions reviewed with patient by: Meredith  Clearances: N/A

## 2025-04-26 LAB — TSH SERPL-ACNC: 3.36 MIU/L (ref 0.4–4.5)

## 2025-04-28 ENCOUNTER — ANESTHESIA EVENT (OUTPATIENT)
Dept: ANESTHESIOLOGY | Facility: HOSPITAL | Age: 60
End: 2025-04-28

## 2025-04-28 ENCOUNTER — ANESTHESIA (OUTPATIENT)
Dept: ANESTHESIOLOGY | Facility: HOSPITAL | Age: 60
End: 2025-04-28

## 2025-05-01 ENCOUNTER — HOSPITAL ENCOUNTER (OUTPATIENT)
Dept: GASTROENTEROLOGY | Facility: MEDICAL CENTER | Age: 60
Setting detail: OUTPATIENT SURGERY
End: 2025-05-01
Payer: COMMERCIAL

## 2025-05-01 ENCOUNTER — ANESTHESIA EVENT (OUTPATIENT)
Dept: GASTROENTEROLOGY | Facility: MEDICAL CENTER | Age: 60
End: 2025-05-01
Payer: COMMERCIAL

## 2025-05-01 ENCOUNTER — ANESTHESIA (OUTPATIENT)
Dept: GASTROENTEROLOGY | Facility: MEDICAL CENTER | Age: 60
End: 2025-05-01
Payer: COMMERCIAL

## 2025-05-01 VITALS
TEMPERATURE: 97.9 F | RESPIRATION RATE: 16 BRPM | BODY MASS INDEX: 24.4 KG/M2 | HEART RATE: 54 BPM | SYSTOLIC BLOOD PRESSURE: 101 MMHG | WEIGHT: 161 LBS | HEIGHT: 68 IN | OXYGEN SATURATION: 98 % | DIASTOLIC BLOOD PRESSURE: 57 MMHG

## 2025-05-01 DIAGNOSIS — R10.13 EPIGASTRIC PAIN: ICD-10-CM

## 2025-05-01 DIAGNOSIS — R11.0 NAUSEA: ICD-10-CM

## 2025-05-01 PROBLEM — E03.9 HYPOTHYROIDISM: Status: ACTIVE | Noted: 2025-05-01

## 2025-05-01 PROCEDURE — 43239 EGD BIOPSY SINGLE/MULTIPLE: CPT | Performed by: INTERNAL MEDICINE

## 2025-05-01 PROCEDURE — 43251 EGD REMOVE LESION SNARE: CPT | Performed by: INTERNAL MEDICINE

## 2025-05-01 PROCEDURE — 88312 SPECIAL STAINS GROUP 1: CPT | Performed by: PATHOLOGY

## 2025-05-01 PROCEDURE — 88342 IMHCHEM/IMCYTCHM 1ST ANTB: CPT | Performed by: PATHOLOGY

## 2025-05-01 PROCEDURE — 88360 TUMOR IMMUNOHISTOCHEM/MANUAL: CPT | Performed by: PATHOLOGY

## 2025-05-01 PROCEDURE — 88305 TISSUE EXAM BY PATHOLOGIST: CPT | Performed by: PATHOLOGY

## 2025-05-01 RX ORDER — LIDOCAINE HYDROCHLORIDE 10 MG/ML
INJECTION, SOLUTION EPIDURAL; INFILTRATION; INTRACAUDAL; PERINEURAL AS NEEDED
Status: DISCONTINUED | OUTPATIENT
Start: 2025-05-01 | End: 2025-05-01

## 2025-05-01 RX ORDER — SODIUM CHLORIDE, SODIUM LACTATE, POTASSIUM CHLORIDE, CALCIUM CHLORIDE 600; 310; 30; 20 MG/100ML; MG/100ML; MG/100ML; MG/100ML
INJECTION, SOLUTION INTRAVENOUS CONTINUOUS PRN
Status: DISCONTINUED | OUTPATIENT
Start: 2025-05-01 | End: 2025-05-01

## 2025-05-01 RX ORDER — PROPOFOL 10 MG/ML
INJECTION, EMULSION INTRAVENOUS AS NEEDED
Status: DISCONTINUED | OUTPATIENT
Start: 2025-05-01 | End: 2025-05-01

## 2025-05-01 RX ADMIN — SODIUM CHLORIDE, SODIUM LACTATE, POTASSIUM CHLORIDE, AND CALCIUM CHLORIDE: .6; .31; .03; .02 INJECTION, SOLUTION INTRAVENOUS at 13:20

## 2025-05-01 RX ADMIN — PROPOFOL 40 MG: 10 INJECTION, EMULSION INTRAVENOUS at 13:26

## 2025-05-01 RX ADMIN — PROPOFOL 50 MG: 10 INJECTION, EMULSION INTRAVENOUS at 13:30

## 2025-05-01 RX ADMIN — PROPOFOL 40 MG: 10 INJECTION, EMULSION INTRAVENOUS at 13:28

## 2025-05-01 RX ADMIN — PROPOFOL 50 MG: 10 INJECTION, EMULSION INTRAVENOUS at 13:31

## 2025-05-01 RX ADMIN — PROPOFOL 120 MG: 10 INJECTION, EMULSION INTRAVENOUS at 13:24

## 2025-05-01 RX ADMIN — PROPOFOL 30 MG: 10 INJECTION, EMULSION INTRAVENOUS at 13:32

## 2025-05-01 RX ADMIN — LIDOCAINE HYDROCHLORIDE 50 MG: 10 INJECTION, SOLUTION EPIDURAL; INFILTRATION; INTRACAUDAL at 13:24

## 2025-05-01 NOTE — H&P
History and Physical -  [facility]  Casie Rosenberg 59 y.o. female MRN: [id number]                  HPI: Casie Rosenberg is a 59 y.o. year old female who presents for EGD to investigate abdominal pain, nausea.  Patient also has a history of constipation.      REVIEW OF SYSTEMS: Per the HPI, and otherwise unremarkable.    Historical Information   Past Medical History:   Diagnosis Date    Endometriosis     History of shingles     IBS (irritable bowel syndrome)     Leiomyoma     Ovarian cyst     Reflux esophagitis      Past Surgical History:   Procedure Laterality Date    BREAST BIOPSY      COLONOSCOPY      LAPAROSCOPIC SUPRACERVICAL HYSTERECTOMY      LAPAROSCOPY      MYOMECTOMY      NECK SURGERY      radical resection of soft tissue tumor of neck     Social History   Social History     Substance and Sexual Activity   Alcohol Use Yes    Comment: socially     Social History     Substance and Sexual Activity   Drug Use No     Social History     Tobacco Use   Smoking Status Never    Passive exposure: Never   Smokeless Tobacco Never     Family History   Problem Relation Age of Onset    No Known Problems Mother     No Known Problems Father        Meds/Allergies     Not in a hospital admission.    Allergies   Allergen Reactions    Contrast [Iodinated Contrast Media] Sneezing     Allergy prep for cta 7/3/24    Buspirone      Worsening agitation and rage    Gluten Meal - Food Allergy GI Intolerance    Omeprazole GI Intolerance     Other reaction(s): Unknown    Other Sneezing     ragweed, dust ,mold     Prochlorperazine Other (See Comments)    Sulfa Antibiotics Hives     vomitting    Sulfamethoxazole-Trimethoprim      Tongue swelling    Topiramate Rash       Objective     There were no vitals taken for this visit.      PHYSICAL EXAM    Gen: NAD  CV: RRR  CHEST: Clear  ABD: soft, NT/ND  EXT: no edema      ASSESSMENT/PLAN:  Casie Rosenberg is a 59 y.o. year old female who presents for EGD to investigate abdominal  pain, nausea.  Patient also has a history of constipation. The patient is stable and optimized for the procedure, we reviewed risk and benefits. Risk include but not limited to infection, bleeding, perforation and missing a lesion.

## 2025-05-01 NOTE — ANESTHESIA PREPROCEDURE EVALUATION
Procedure:  EGD    Relevant Problems   ANESTHESIA (within normal limits)      CARDIO   (-) BOOGIE (dyspnea on exertion)      ENDO   (+) Hypothyroidism      NEURO/PSYCH   (+) Anxiety   (-) Seizures (HCC)      PULMONARY   (-) Asthma   (-) Sleep apnea        Physical Exam    Airway    Mallampati score: II  TM Distance: >3 FB  Neck ROM: full     Dental   No notable dental hx     Cardiovascular  Cardiovascular exam normal    Pulmonary  Pulmonary exam normal     Other Findings  post-pubertal.      Anesthesia Plan  ASA Score- 2     Anesthesia Type- IV sedation with anesthesia with ASA Monitors.         Additional Monitors:     Airway Plan:            Plan Factors-Exercise tolerance (METS): >4 METS.    Chart reviewed. EKG reviewed. Imaging results reviewed. Existing labs reviewed. Patient summary reviewed.    Patient is not a current smoker.              Induction- intravenous.    Postoperative Plan-         Informed Consent- Anesthetic plan and risks discussed with patient.  I personally reviewed this patient with the CRNA. Discussed and agreed on the Anesthesia Plan with the CRNA..      NPO Status:  Vitals Value Taken Time   Date of last liquid 05/01/25 05/01/25 1305   Time of last liquid 0700 05/01/25 1305   Date of last solid 04/30/25 05/01/25 1305   Time of last solid 2200 05/01/25 1305

## 2025-05-01 NOTE — ANESTHESIA POSTPROCEDURE EVALUATION
Post-Op Assessment Note    CV Status:  Stable    Pain management: adequate       Mental Status:  Alert and awake   Hydration Status:  Euvolemic   PONV Controlled:  Controlled   Airway Patency:  Patent     Post Op Vitals Reviewed: Yes    No anethesia notable event occurred.    Staff: CRNA           Last Filed PACU Vitals:  Vitals Value Taken Time   Temp     Pulse 63 05/01/25 1338   BP 99/54 05/01/25 1338   Resp 16 05/01/25 1338   SpO2 95 % 05/01/25 1338       Modified Saleem:     Vitals Value Taken Time   Activity 0 05/01/25 1338   Respiration 2 05/01/25 1338   Circulation 2 05/01/25 1338   Consciousness 0 05/01/25 1338   Oxygen Saturation 2 05/01/25 1338     Modified Saleem Score: 6

## 2025-05-02 ENCOUNTER — TELEPHONE (OUTPATIENT)
Age: 60
End: 2025-05-02

## 2025-05-02 NOTE — TELEPHONE ENCOUNTER
Patient had Endoscopy 5/1 today she has chills and feverish at 99.7.  She wanted to know what she she take for her symptoms.

## 2025-05-02 NOTE — TELEPHONE ENCOUNTER
Patient calling after EGD yesterday.  States woke up with fever of 99.8-100, body aches, headache, and chills.  Feels like she has the flu.  Denies abdominal pain, nausea, or vomiting.  Patient will take tylenol for discomfort.  Please advise.

## 2025-05-09 ENCOUNTER — RESULTS FOLLOW-UP (OUTPATIENT)
Dept: GASTROENTEROLOGY | Facility: CLINIC | Age: 60
End: 2025-05-09

## 2025-05-09 DIAGNOSIS — R10.13 EPIGASTRIC PAIN: Primary | ICD-10-CM

## 2025-05-09 DIAGNOSIS — K20.90 ESOPHAGITIS: ICD-10-CM

## 2025-05-09 RX ORDER — OMEPRAZOLE 40 MG/1
40 CAPSULE, DELAYED RELEASE ORAL DAILY
Qty: 30 CAPSULE | Refills: 5 | Status: SHIPPED | OUTPATIENT
Start: 2025-05-09 | End: 2025-11-05

## 2025-05-09 NOTE — TELEPHONE ENCOUNTER
Pt would like to be scheduled sooner due to EGD results . Pt is scheduled for soonest available o/v as of now.

## 2025-05-09 NOTE — TELEPHONE ENCOUNTER
Pt called again for sooner appt because she would not have been able to make the 2pm in [place] for today. Transferred call to Nancy   PERRL/EOMI/conjunctiva clear/normal

## 2025-05-09 NOTE — TELEPHONE ENCOUNTER
Pt transferred to nurses line for sooner appt.     I spoke with pt and reviewed biopsy results and answered all questions regarding omeprazole medication. Pt having RUQ pain like a knot feeling and coughing at night. No burning pain.     Pt just started omeprazole- I informed her to give this a few weeks for it to work and will follow up at appt on 6/30. She understood.

## 2025-05-19 ENCOUNTER — TELEMEDICINE (OUTPATIENT)
Dept: FAMILY MEDICINE CLINIC | Facility: CLINIC | Age: 60
End: 2025-05-19
Payer: COMMERCIAL

## 2025-05-19 VITALS — HEIGHT: 68 IN | WEIGHT: 161 LBS | BODY MASS INDEX: 24.4 KG/M2

## 2025-05-19 DIAGNOSIS — R10.9 ABDOMINAL PAIN, UNSPECIFIED ABDOMINAL LOCATION: Primary | ICD-10-CM

## 2025-05-19 DIAGNOSIS — E03.9 HYPOTHYROIDISM, UNSPECIFIED TYPE: ICD-10-CM

## 2025-05-19 PROCEDURE — 98005 SYNCH AUDIO-VIDEO EST LOW 20: CPT | Performed by: FAMILY MEDICINE

## 2025-05-19 RX ORDER — TRETINOIN 0.25 MG/G
CREAM TOPICAL
COMMUNITY
Start: 2025-04-24

## 2025-05-19 RX ORDER — OMEPRAZOLE 20 MG/1
20 CAPSULE, DELAYED RELEASE ORAL
Qty: 30 CAPSULE | Refills: 5 | Status: SHIPPED | OUTPATIENT
Start: 2025-05-19 | End: 2025-11-15

## 2025-05-19 RX ORDER — PREDNISONE 20 MG/1
TABLET ORAL
COMMUNITY
Start: 2025-05-17 | End: 2025-05-19

## 2025-05-19 NOTE — PROGRESS NOTES
"Virtual Regular VisitName: Casie Rosenberg      : 1965      MRN: [id number]  Encounter Provider: Robert Budinetz, MD  Encounter Date: 2025   Encounter department: [facility] PRIMARY [facility]ARE  :  Assessment & Plan  Abdominal pain, unspecified abdominal location  S/p EGD  H pylori negative  Start omeprazole 20mg qd  Orders:    omeprazole (PriLOSEC) 20 mg delayed release capsule; Take 1 capsule (20 mg total) by mouth daily before breakfast    Hypothyroidism, unspecified type  Continue synthroid             History of Present Illness     The patient had her EGD.  She had 1 gastric polyp removed there was some duodenal irritation and inflammation she gets intermittent nausea probably from recent prednisone working to start the omeprazole at 20 mg daily she is taking her thyroid medicine.      Review of Systems   Respiratory: Negative.     Cardiovascular: Negative.        Objective   Ht 5' 8\" (1.727 m)   Wt 73 kg (161 lb)   BMI 24.48 kg/m²     Physical Exam  Constitutional:       General: She is not in acute distress.     Appearance: She is well-developed.     Cardiovascular:      Heart sounds: No murmur heard.  Pulmonary:      Effort: No respiratory distress.   Abdominal:      Tenderness: There is no abdominal tenderness.     Musculoskeletal:         General: No swelling.       Administrative Statements   Encounter provider Robert Budinetz, MD    The Patient is located at Home and in the following state in which I hold an active license [place].    The patient was identified by name and date of birth. Casie Rosenberg was informed that this is a telemedicine visit and that the visit is being conducted through the F[employer] platform. She agrees to proceed..  My office door was closed. No one else was in the room.  She acknowledged consent and understanding of privacy and security of the video platform. The patient has agreed to participate and understands they can discontinue the visit at any time.    I have " spent a total time of 12 minutes in caring for this patient on the day of the visit/encounter including , not including the time spent for establishing the audio/video connection.

## 2025-05-26 ENCOUNTER — HOSPITAL ENCOUNTER (EMERGENCY)
Facility: HOSPITAL | Age: 60
Discharge: HOME | End: 2025-05-26
Attending: EMERGENCY MEDICINE
Payer: COMMERCIAL

## 2025-05-26 ENCOUNTER — NURSE TRIAGE (OUTPATIENT)
Dept: OTHER | Facility: OTHER | Age: 60
End: 2025-05-26

## 2025-05-26 VITALS
BODY MASS INDEX: 24.25 KG/M2 | HEIGHT: 68 IN | WEIGHT: 160 LBS | RESPIRATION RATE: 18 BRPM | OXYGEN SATURATION: 98 % | DIASTOLIC BLOOD PRESSURE: 60 MMHG | HEART RATE: 73 BPM | TEMPERATURE: 98.4 F | SYSTOLIC BLOOD PRESSURE: 134 MMHG

## 2025-05-26 DIAGNOSIS — L25.5 RHUS DERMATITIS: ICD-10-CM

## 2025-05-26 DIAGNOSIS — R11.0 NAUSEA: Primary | ICD-10-CM

## 2025-05-26 LAB
ALBUMIN SERPL BCG-MCNC: 4.1 G/DL (ref 3.5–5)
ALP SERPL-CCNC: 62 U/L (ref 34–104)
ALT SERPL W P-5'-P-CCNC: 10 U/L (ref 7–52)
ANION GAP SERPL CALCULATED.3IONS-SCNC: 5 MMOL/L (ref 4–13)
AST SERPL W P-5'-P-CCNC: 7 U/L (ref 13–39)
BASOPHILS # BLD AUTO: 0.04 THOUSANDS/ÂΜL (ref 0–0.1)
BASOPHILS NFR BLD AUTO: 1 % (ref 0–1)
BILIRUB SERPL-MCNC: 0.49 MG/DL (ref 0.2–1)
BUN SERPL-MCNC: 28 MG/DL (ref 5–25)
CALCIUM SERPL-MCNC: 9.4 MG/DL (ref 8.4–10.2)
CHLORIDE SERPL-SCNC: 103 MMOL/L (ref 96–108)
CO2 SERPL-SCNC: 31 MMOL/L (ref 21–32)
CREAT SERPL-MCNC: 0.89 MG/DL (ref 0.6–1.3)
EOSINOPHIL # BLD AUTO: 0.33 THOUSAND/ÂΜL (ref 0–0.61)
EOSINOPHIL NFR BLD AUTO: 6 % (ref 0–6)
ERYTHROCYTE [DISTWIDTH] IN BLOOD BY AUTOMATED COUNT: 11.9 % (ref 11.6–15.1)
GFR SERPL CREATININE-BSD FRML MDRD: 70 ML/MIN/1.73SQ M
GLUCOSE SERPL-MCNC: 99 MG/DL (ref 65–140)
HCT VFR BLD AUTO: 41.1 % (ref 34.8–46.1)
HGB BLD-MCNC: 13.6 G/DL (ref 11.5–15.4)
IMM GRANULOCYTES # BLD AUTO: 0.02 THOUSAND/UL (ref 0–0.2)
IMM GRANULOCYTES NFR BLD AUTO: 0 % (ref 0–2)
LYMPHOCYTES # BLD AUTO: 1.34 THOUSANDS/ÂΜL (ref 0.6–4.47)
LYMPHOCYTES NFR BLD AUTO: 24 % (ref 14–44)
MCH RBC QN AUTO: 32.1 PG (ref 26.8–34.3)
MCHC RBC AUTO-ENTMCNC: 33.1 G/DL (ref 31.4–37.4)
MCV RBC AUTO: 97 FL (ref 82–98)
MONOCYTES # BLD AUTO: 0.62 THOUSAND/ÂΜL (ref 0.17–1.22)
MONOCYTES NFR BLD AUTO: 11 % (ref 4–12)
NEUTROPHILS # BLD AUTO: 3.22 THOUSANDS/ÂΜL (ref 1.85–7.62)
NEUTS SEG NFR BLD AUTO: 58 % (ref 43–75)
NRBC BLD AUTO-RTO: 0 /100 WBCS
PLATELET # BLD AUTO: 269 THOUSANDS/UL (ref 149–390)
PMV BLD AUTO: 10.1 FL (ref 8.9–12.7)
POTASSIUM SERPL-SCNC: 4 MMOL/L (ref 3.5–5.3)
PROT SERPL-MCNC: 7.4 G/DL (ref 6.4–8.4)
RBC # BLD AUTO: 4.24 MILLION/UL (ref 3.81–5.12)
SODIUM SERPL-SCNC: 139 MMOL/L (ref 135–147)
WBC # BLD AUTO: 5.57 THOUSAND/UL (ref 4.31–10.16)

## 2025-05-26 PROCEDURE — 85025 COMPLETE CBC W/AUTO DIFF WBC: CPT | Performed by: PHYSICIAN ASSISTANT

## 2025-05-26 PROCEDURE — 99283 EMERGENCY DEPT VISIT LOW MDM: CPT

## 2025-05-26 PROCEDURE — 96375 TX/PRO/DX INJ NEW DRUG ADDON: CPT

## 2025-05-26 PROCEDURE — 99284 EMERGENCY DEPT VISIT MOD MDM: CPT | Performed by: PHYSICIAN ASSISTANT

## 2025-05-26 PROCEDURE — 80053 COMPREHEN METABOLIC PANEL: CPT | Performed by: PHYSICIAN ASSISTANT

## 2025-05-26 PROCEDURE — 86618 LYME DISEASE ANTIBODY: CPT | Performed by: PHYSICIAN ASSISTANT

## 2025-05-26 PROCEDURE — 96374 THER/PROPH/DIAG INJ IV PUSH: CPT

## 2025-05-26 PROCEDURE — 36415 COLL VENOUS BLD VENIPUNCTURE: CPT | Performed by: PHYSICIAN ASSISTANT

## 2025-05-26 RX ORDER — ONDANSETRON 2 MG/ML
4 INJECTION INTRAMUSCULAR; INTRAVENOUS ONCE
Status: COMPLETED | OUTPATIENT
Start: 2025-05-26 | End: 2025-05-26

## 2025-05-26 RX ORDER — METHYLPREDNISOLONE SODIUM SUCCINATE 125 MG/2ML
125 INJECTION, POWDER, LYOPHILIZED, FOR SOLUTION INTRAMUSCULAR; INTRAVENOUS ONCE
Status: COMPLETED | OUTPATIENT
Start: 2025-05-26 | End: 2025-05-26

## 2025-05-26 RX ORDER — METHYLPREDNISOLONE 4 MG/1
TABLET ORAL
Qty: 21 TABLET | Refills: 0 | Status: SHIPPED | OUTPATIENT
Start: 2025-05-26

## 2025-05-26 RX ORDER — DIPHENHYDRAMINE HYDROCHLORIDE 50 MG/ML
25 INJECTION, SOLUTION INTRAMUSCULAR; INTRAVENOUS ONCE
Status: COMPLETED | OUTPATIENT
Start: 2025-05-26 | End: 2025-05-26

## 2025-05-26 RX ORDER — ONDANSETRON 4 MG/1
4 TABLET, ORALLY DISINTEGRATING ORAL EVERY 6 HOURS PRN
Qty: 20 TABLET | Refills: 0 | Status: SHIPPED | OUTPATIENT
Start: 2025-05-26

## 2025-05-26 RX ADMIN — METHYLPREDNISOLONE SODIUM SUCCINATE 125 MG: 125 INJECTION, POWDER, FOR SOLUTION INTRAMUSCULAR; INTRAVENOUS at 11:45

## 2025-05-26 RX ADMIN — ONDANSETRON 4 MG: 2 INJECTION INTRAMUSCULAR; INTRAVENOUS at 11:48

## 2025-05-26 RX ADMIN — DIPHENHYDRAMINE HYDROCHLORIDE 25 MG: 50 INJECTION, SOLUTION INTRAMUSCULAR; INTRAVENOUS at 11:49

## 2025-05-26 NOTE — ED PROVIDER NOTES
Time reflects when diagnosis was documented in both MDM as applicable and the Disposition within this note       Time User Action Codes Description Comment    5/26/2025 12:39 PM Dottie Sauer [R11.0] Nausea     5/26/2025 12:39 PM Dottie Sauer [L25.5] Rhus dermatitis           ED Disposition       ED Disposition   Discharge    Condition   Stable    Date/Time   Mon May 26, 2025 12:39 PM    Comment   Casie Rosenberg discharge to home/self care.                   Assessment & Plan       Medical Decision Making  Patient with rhus dermatitis, not improving, will try medrol dose pack and zyrtec and refer to dermatology.     Amount and/or Complexity of Data Reviewed  Labs: ordered.    Risk  Prescription drug management.             Medications   ondansetron (ZOFRAN) injection 4 mg (4 mg Intravenous Given 5/26/25 1148)   methylPREDNISolone sodium succinate (Solu-MEDROL) injection 125 mg (125 mg Intravenous Given 5/26/25 1145)   diphenhydrAMINE (BENADRYL) injection 25 mg (25 mg Intravenous Given 5/26/25 1149)       ED Risk Strat Scores                    No data recorded                            History of Present Illness       Chief Complaint   Patient presents with    Nausea     Pt reports taken medication from urgent care for poison ivy. Pt reports abx making her nausea and are not working       Past Medical History[1]   Past Surgical History[2]   Family History[3]   Social History[4]   E-Cigarette/Vaping    E-Cigarette Use Never User       E-Cigarette/Vaping Substances    Nicotine No     THC No     CBD No     Flavoring No     Other No     Unknown No       I have reviewed and agree with the history as documented.     Patient is a 58 y/o F that presents to the ED with rash to left thigh, left abdomen, b/l forearms, back, right upper arm for about 2 weeks.  SHe was seen by urgent care and started on prednisone, but patient stopped taking it because it made her vomit.  She has been taking claritin and  benadryl and atarax for the itching, but states she is so nauseated and feels like the rash is spreading.  She states the rash is very itchy.  No fevers.  Patient was seen by dermatologist and told it was poison and to continue antihistamine.       History provided by:  Patient  Nausea  The primary symptoms include fatigue, nausea and rash. Primary symptoms do not include fever or diarrhea.       Review of Systems   Constitutional:  Positive for fatigue. Negative for fever.   Gastrointestinal:  Positive for nausea. Negative for diarrhea.   Skin:  Positive for rash.   Neurological:  Negative for dizziness, weakness and numbness.   Psychiatric/Behavioral:  Negative for confusion.    All other systems reviewed and are negative.          Objective       ED Triage Vitals [05/26/25 1019]   Temperature Pulse Blood Pressure Respirations SpO2 Patient Position - Orthostatic VS   98.4 °F (36.9 °C) 73 134/60 18 98 % Sitting      Temp Source Heart Rate Source BP Location FiO2 (%) Pain Score    Temporal Monitor Left arm -- 7      Vitals      Date and Time Temp Pulse SpO2 Resp BP Pain Score FACES Pain Rating User   05/26/25 1019 98.4 °F (36.9 °C) 73 98 % 18 134/60 7 -- CM            Physical Exam  Vitals and nursing note reviewed.   Constitutional:       General: She is not in acute distress.     Appearance: Normal appearance. She is well-developed and well-groomed. She is not ill-appearing or diaphoretic.   HENT:      Head: Normocephalic and atraumatic.      Right Ear: Hearing normal.      Left Ear: Hearing normal.      Nose: Nose normal.      Mouth/Throat:      Mouth: Mucous membranes are moist.     Eyes:      Conjunctiva/sclera: Conjunctivae normal.       Cardiovascular:      Rate and Rhythm: Normal rate and regular rhythm.      Heart sounds: Normal heart sounds.   Pulmonary:      Effort: Pulmonary effort is normal.      Breath sounds: Normal breath sounds.     Musculoskeletal:         General: Normal range of motion.       Cervical back: Normal range of motion.      Right lower leg: No edema.      Left lower leg: No edema.     Skin:     General: Skin is warm and dry.      Findings: Rash (erythematous rash to b/l arms, left thigh and abdomen.) present.     Neurological:      General: No focal deficit present.      Mental Status: She is alert and oriented to person, place, and time.      Motor: No weakness.     Psychiatric:         Behavior: Behavior is cooperative.                   Results Reviewed       Procedure Component Value Units Date/Time    Comprehensive metabolic panel [[id number]]  (Abnormal) Collected: 05/26/25 1144    Lab Status: Final result Specimen: Blood from Arm, Right Updated: 05/26/25 1222     Sodium 139 mmol/L      Potassium 4.0 mmol/L      Chloride 103 mmol/L      CO2 31 mmol/L      ANION GAP 5 mmol/L      BUN 28 mg/dL      Creatinine 0.89 mg/dL      Glucose 99 mg/dL      Calcium 9.4 mg/dL      AST 7 U/L      ALT 10 U/L      Alkaline Phosphatase 62 U/L      Total Protein 7.4 g/dL      Albumin 4.1 g/dL      Total Bilirubin 0.49 mg/dL      eGFR 70 ml/min/1.73sq m     Narrative:      National Kidney Disease Foundation guidelines for Chronic Kidney Disease (CKD):     Stage 1 with normal or high GFR (GFR > 90 mL/min/1.73 square meters)    Stage 2 Mild CKD (GFR = 60-89 mL/min/1.73 square meters)    Stage 3A Moderate CKD (GFR = 45-59 mL/min/1.73 square meters)    Stage 3B Moderate CKD (GFR = 30-44 mL/min/1.73 square meters)    Stage 4 Severe CKD (GFR = 15-29 mL/min/1.73 square meters)    Stage 5 End Stage CKD (GFR <15 mL/min/1.73 square meters)  Note: GFR calculation is accurate only with a steady state creatinine    CBC and differential [[id number]] Collected: 05/26/25 1144    Lab Status: Final result Specimen: Blood from Arm, Right Updated: 05/26/25 1204     WBC 5.57 Thousand/uL      RBC 4.24 Million/uL      Hemoglobin 13.6 g/dL      Hematocrit 41.1 %      MCV 97 fL      MCH 32.1 pg      MCHC 33.1 g/dL      RDW 11.9 %       MPV 10.1 fL      Platelets 269 Thousands/uL      nRBC 0 /100 WBCs      Segmented % 58 %      Immature Grans % 0 %      Lymphocytes % 24 %      Monocytes % 11 %      Eosinophils Relative 6 %      Basophils Relative 1 %      Absolute Neutrophils 3.22 Thousands/µL      Absolute Immature Grans 0.02 Thousand/uL      Absolute Lymphocytes 1.34 Thousands/µL      Absolute Monocytes 0.62 Thousand/µL      Eosinophils Absolute 0.33 Thousand/µL      Basophils Absolute 0.04 Thousands/µL     Lyme Total AB W Reflex to IGM/IGG [[id number]] Collected: 05/26/25 1144    Lab Status: In process Specimen: Blood from Arm, Right Updated: 05/26/25 1202    Narrative:      The following orders were created for panel order Lyme Total AB W Reflex to IGM/IGG.  Procedure                               Abnormality         Status                     ---------                               -----------         ------                     Lyme Total AB W Reflex t...[[id number]]                      In process                   Please view results for these tests on the individual orders.    Lyme Total AB W Reflex to IGM/IGG [[id number]] Collected: 05/26/25 1144    Lab Status: In process Specimen: Blood from Arm, Right Updated: 05/26/25 1202            No orders to display       Procedures    ED Medication and Procedure Management   Prior to Admission Medications   Prescriptions Last Dose Informant Patient Reported? Taking?   Cholecalciferol (VITAMIN D-3) 1000 units CAPS   Yes No   Coenzyme Q10 10 MG capsule   Yes No   Sig: Take 10 mg by mouth in the morning.   Multiple Vitamins-Minerals (MULTIVITAMIN PO)   Yes No   levothyroxine 25 mcg tablet   No No   Sig: TAKE 1 TABLET BY MOUTH EVERY DAY   omeprazole (PriLOSEC) 20 mg delayed release capsule   No No   Sig: Take 1 capsule (20 mg total) by mouth daily before breakfast   tretinoin (RETIN-A) 0.025 % cream   Yes No   Sig: Apply topically      Facility-Administered Medications: None     Discharge Medication  List as of 5/26/2025 12:50 PM        START taking these medications    Details   methylprednisolone (MEDROL) 4 mg tablet Medrol dose pack:  take as directed, Normal      ondansetron (ZOFRAN-ODT) 4 mg disintegrating tablet Take 1 tablet (4 mg total) by mouth every 6 (six) hours as needed for nausea or vomiting, Starting Mon 5/26/2025, Normal           CONTINUE these medications which have NOT CHANGED    Details   Cholecalciferol (VITAMIN D-3) 1000 units CAPS Historical Med      Coenzyme Q10 10 MG capsule Take 10 mg by mouth in the morning., Historical Med      levothyroxine 25 mcg tablet TAKE 1 TABLET BY MOUTH EVERY DAY, Starting Thu 4/3/2025, Normal      Multiple Vitamins-Minerals (MULTIVITAMIN PO) Historical Med      omeprazole (PriLOSEC) 20 mg delayed release capsule Take 1 capsule (20 mg total) by mouth daily before breakfast, Starting Mon 5/19/2025, Until Sat 11/15/2025, Normal      tretinoin (RETIN-A) 0.025 % cream Apply topically, Starting Thu 4/24/2025, Historical Med           No discharge procedures on file.  ED SEPSIS DOCUMENTATION   Time reflects when diagnosis was documented in both MDM as applicable and the Disposition within this note       Time User Action Codes Description Comment    5/26/2025 12:39 PM Dottie Sauer [R11.0] Nausea     5/26/2025 12:39 PM Dottie Sauer [L25.5] Rhus dermatitis                      [1]   Past Medical History:  Diagnosis Date    Endometriosis     History of shingles     IBS (irritable bowel syndrome)     Leiomyoma     Ovarian cyst     Reflux esophagitis    [2]   Past Surgical History:  Procedure Laterality Date    BREAST BIOPSY      COLONOSCOPY      LAPAROSCOPIC SUPRACERVICAL HYSTERECTOMY      LAPAROSCOPY      MYOMECTOMY      NECK SURGERY      radical resection of soft tissue tumor of neck   [3]   Family History  Problem Relation Name Age of Onset    No Known Problems Mother      No Known Problems Father     [4]   Social History  Tobacco Use    Smoking  status: Never     Passive exposure: Never    Smokeless tobacco: Never   Vaping Use    Vaping status: Never Used   Substance Use Topics    Alcohol use: Yes     Comment: socially    Drug use: No        Dottie Sauer PA-C  05/26/25 1252

## 2025-05-26 NOTE — TELEPHONE ENCOUNTER
"FOLLOW UP: No follow up at this time.     REASON FOR CONVERSATION: Poison Ivy    SYMPTOMS: Probable poison ivy rash X's 2 weeks that is getting worse. Reports red streaks on hips and now overall feels unwell. Felt feverish last night- chills but denies fever. Skin intact.     OTHER: no other symptoms.     DISPOSITION: Go to ED Now (or PCP Triage)    Pt will go to [facility].     Reason for Disposition   Patient sounds very sick or weak to the triager    Answer Assessment - Initial Assessment Questions  1. APPEARANCE of RASH: \"Describe the rash.\"       Red, raised, bumpy rash. Skin is intact. Red streaks near hip, red/purple patch near leg.     2. LOCATION: \"Where is the rash located?\"  (e.g., face, genitals, hands, legs)      Abdomen, arms, legs, back    3. SIZE: \"How large is the rash?\"       Unspecified    4. ONSET: \"When did the rash begin?\"       2 weeks but getting worse in the last week.     5. ITCHING: \"Does the rash itch?\" If Yes, ask: \"How bad is it?\"      Very itchy    6. EXPOSURE:  \"How were you exposed to the plant (poison ivy, poison oak, sumac)\"  \"When were you exposed?\"        [facility] uncertain if it is poison ivy or sumac.    7. PAST HISTORY: \"Have you had a poison ivy rash before?\" If Yes, ask: \"How bad was it?\"      No    8. OTHER      Was prescribed prednisone through [facility]- pt states she was unable to tolerate the steroid- states she vomited throughout the day. Denies fever. Taking hydroxyzine 10 mg TID, Benadryl at night. Dermaleave, Mupirocin. Denies fever but was feeling feverish yesterday and overall says she just doesn't feel right.    Protocols used: Poison Ivy - Oak - Sumac-Adult-    "

## 2025-05-26 NOTE — TELEPHONE ENCOUNTER
"Regarding: Rash/ poison Ivy/ itching  ----- Message from Justin TELLEZ sent at 5/26/2025  9:38 AM EDT -----  Patient stated, \"I was seen by Derm and was told that I have poison ivy on my arms, legs, chest, face.I am doing everything with no relief, the itching is unbearable. It will be 2 weeks on Wednesday.\"    " - [facility]'s solution QID  - GI cocktail QID PRN

## 2025-05-26 NOTE — DISCHARGE INSTRUCTIONS
Continue steroid cream to rash.  Avoid scratching.  Take zyrtec at night.  Discontinue hydroxyzine.  Start medrol dose pack tomorrow.  Cool compresses to rash.  Follow up with dermatology.

## 2025-05-27 LAB — B BURGDOR IGG+IGM SER QL IA: NEGATIVE

## 2025-06-10 DIAGNOSIS — R10.9 ABDOMINAL PAIN, UNSPECIFIED ABDOMINAL LOCATION: ICD-10-CM

## 2025-06-10 RX ORDER — OMEPRAZOLE 20 MG/1
20 CAPSULE, DELAYED RELEASE ORAL
Qty: 90 CAPSULE | Refills: 2 | OUTPATIENT
Start: 2025-06-10

## 2025-06-11 ENCOUNTER — TELEPHONE (OUTPATIENT)
Dept: FAMILY MEDICINE CLINIC | Facility: CLINIC | Age: 60
End: 2025-06-11

## 2025-06-11 NOTE — TELEPHONE ENCOUNTER
Patient attribution    Patient is now seen by Dr Rosa Cross. Please remove our office as pcp and update chart    Thank you

## 2025-06-26 RX ORDER — CLOBETASOL PROPIONATE 0.5 MG/G
OINTMENT TOPICAL
COMMUNITY
Start: 2025-05-23

## 2025-06-26 RX ORDER — MUPIROCIN 2 %
OINTMENT (GRAM) TOPICAL
COMMUNITY
Start: 2025-05-23

## 2025-06-26 RX ORDER — HYDROXYZINE HYDROCHLORIDE 10 MG/1
TABLET, FILM COATED ORAL
COMMUNITY
Start: 2025-05-23

## 2025-06-26 RX ORDER — ESCITALOPRAM OXALATE 5 MG/1
TABLET ORAL
COMMUNITY

## 2025-06-30 ENCOUNTER — OFFICE VISIT (OUTPATIENT)
Dept: GASTROENTEROLOGY | Facility: CLINIC | Age: 60
End: 2025-06-30
Payer: COMMERCIAL

## 2025-06-30 VITALS
WEIGHT: 160.4 LBS | BODY MASS INDEX: 24.31 KG/M2 | TEMPERATURE: 98.8 F | SYSTOLIC BLOOD PRESSURE: 118 MMHG | HEIGHT: 68 IN | DIASTOLIC BLOOD PRESSURE: 76 MMHG

## 2025-06-30 DIAGNOSIS — K59.09 CHRONIC CONSTIPATION: ICD-10-CM

## 2025-06-30 DIAGNOSIS — R11.0 NAUSEA: Primary | ICD-10-CM

## 2025-06-30 DIAGNOSIS — K31.7 HYPERPLASTIC POLYP OF STOMACH: ICD-10-CM

## 2025-06-30 PROCEDURE — 99213 OFFICE O/P EST LOW 20 MIN: CPT

## 2025-06-30 NOTE — PROGRESS NOTES
Name: Casie Rosenberg      : 1965      MRN: [id number]  Encounter Provider: Marly Olmos PA-C  Encounter Date: 2025   Encounter department: [facility] [place]  :  Assessment & Plan  Nausea  Patient initially seen due to chronic nausea and bloating.  She also had epigastric/RUQ pain that had resolved prior to last visit.  She underwent EGD which showed nodular mucosa in the lower third of esophagus, subcentimeter hyperplastic polyp in the body of the stomach. Biopsies were unremarkable.  Patient reports symptoms have resolved, no further nausea or abdominal pain.  Patient was on PPI but had self discontinued this.  Okay to continue off PPI as she is feeling well.  Continue to follow GERD diet and avoid food triggers as able.  Continue to avoid NSAIDs. She will reach out if new/worsening symptoms.       Chronic constipation  Symptoms have improved since starting MiraLAX as needed.  Continue with high-fiber diet, adequate hydration, MiraLAX as needed.       Hyperplastic polyp of stomach  Plan repeat EGD 2026.           History of Present Illness   HPI  Casie Rosenberg is a 59 y.o. female with PMH of anxiety, hypothyroidism presenting for follow-up.  I last saw patient 2025 for epigastric/RUQ pain x 1 month.  At that time patient stated this had resolved but had ongoing bloating and nausea.  Given ongoing symptoms plan to EGD.  Patient also with chronic constipation and noted to have significant stool burden on prior CT.  I recommended patient start high-fiber diet, increase water intake, MiraLAX daily.    Patient states she is feeling better, nausea has resolved.  Her constipation has improved with using MiraLAX as needed.  She did have a bad episode of poison ivy and was recommended to take prednisone.  After taking prednisone she had multiple episodes of vomiting and epigastric pain.  She ended up in the ER due to this and received Benadryl, Zofran,  "methylprednisolone injection.  She did take omeprazole 20 mg daily for a few weeks but has since stopped this.  She has also stopped ibuprofen completely.  She is using Tylenol as needed for restless leg syndrome.      Recent CBC, CMP, lipase wnl, TSH 5.119  Neg cologuard 7/2024     Prior imaging/procedures:  EGD 5/2025: Nodular mucosa in the lower third of the esophagus, subcentimeter polyp in the body of the stomach.  Mild edematous erythematous mucosa in duodenal bulb.  (Esophageal biopsy with benign esophageal mucosa with parakeratosis, hyperplastic polyp in stomach, stomach biopsy with mild chronic inactive gastritis, duodenal biopsies unremarkable) -1 year recall  CT A/P without contrast 3/26/2025: Bilateral nonobstructive renal calculi measuring up to 2 mm, moderate stool throughout colon  Abdominal ultrasound 3/2025: 2 mm nonobstructing right renal calculus possible additional small nonobstructing calculi bilaterally  EGD/colonoscopy about 15 years ago, normal per patient    Review of Systems   Constitutional:  Negative for appetite change, chills and fever.   Respiratory:  Negative for shortness of breath.    Gastrointestinal:  Negative for abdominal pain, blood in stool, constipation, diarrhea, nausea and vomiting.     Medical History Reviewed by provider this encounter:  Tobacco  Allergies  Meds  Problems  Med Hx  Surg Hx  Fam Hx     .  Medications Ordered Prior to Encounter[1]   Social History[2]     Objective   /76 (BP Location: Left arm, Patient Position: Sitting, Cuff Size: Adult)   Temp 98.8 °F (37.1 °C) (Tympanic)   Ht 5' 8\" (1.727 m)   Wt 72.8 kg (160 lb 6.4 oz)   BMI 24.39 kg/m²      Physical Exam  Vitals reviewed.   Constitutional:       General: She is not in acute distress.     Appearance: She is not ill-appearing.   HENT:      Head: Normocephalic and atraumatic.     Cardiovascular:      Rate and Rhythm: Normal rate and regular rhythm.   Pulmonary:      Effort: Pulmonary effort " is normal.      Breath sounds: Normal breath sounds.   Abdominal:      General: Abdomen is flat. Bowel sounds are normal. There is no distension.      Palpations: Abdomen is soft.      Tenderness: There is no abdominal tenderness.     Skin:     General: Skin is warm and dry.     Neurological:      Mental Status: She is alert. Mental status is at baseline.                  [1]   Current Outpatient Medications on File Prior to Visit   Medication Sig Dispense Refill    Cholecalciferol (VITAMIN D-3) 1000 units CAPS       clobetasol (TEMOVATE) 0.05 % ointment Apply topically      Coenzyme Q10 10 MG capsule Take 10 mg by mouth in the morning.      escitalopram (Lexapro) 5 mg tablet Take by mouth      hydrOXYzine HCL (ATARAX) 10 mg tablet TAKE 1 BY MOUTH 3 TIMES A DAY AS NEEDED FOR ITCHING      levothyroxine 25 mcg tablet TAKE 1 TABLET BY MOUTH EVERY DAY 90 tablet 1    Multiple Vitamins-Minerals (MULTIVITAMIN PO)       mupirocin (BACTROBAN) 2 % ointment APPLY TWICE DAILY TO AFFECTED AREA FOR 1-2 WEEKS      omeprazole (PriLOSEC) 20 mg delayed release capsule Take 1 capsule (20 mg total) by mouth daily before breakfast 30 capsule 5    methylprednisolone (MEDROL) 4 mg tablet Medrol dose pack:  take as directed 21 tablet 0    ondansetron (ZOFRAN-ODT) 4 mg disintegrating tablet Take 1 tablet (4 mg total) by mouth every 6 (six) hours as needed for nausea or vomiting 20 tablet 0    tretinoin (RETIN-A) 0.025 % cream Apply topically       No current facility-administered medications on file prior to visit.   [2]   Social History  Tobacco Use    Smoking status: Never     Passive exposure: Never    Smokeless tobacco: Never   Vaping Use    Vaping status: Never Used   Substance and Sexual Activity    Alcohol use: Yes     Comment: socially    Drug use: No    Sexual activity: Yes     Partners: Male     Birth control/protection: Post-menopausal     Comment: hysterectomy

## 2025-08-12 ENCOUNTER — DOCUMENTATION (OUTPATIENT)
Dept: ADMINISTRATIVE | Facility: OTHER | Age: 60
End: 2025-08-12